# Patient Record
Sex: FEMALE | Employment: FULL TIME | URBAN - METROPOLITAN AREA
[De-identification: names, ages, dates, MRNs, and addresses within clinical notes are randomized per-mention and may not be internally consistent; named-entity substitution may affect disease eponyms.]

---

## 2017-11-16 ENCOUNTER — COMMUNICATION - HEALTHEAST (OUTPATIENT)
Dept: FAMILY MEDICINE | Facility: CLINIC | Age: 27
End: 2017-11-16

## 2017-11-21 ENCOUNTER — COMMUNICATION - HEALTHEAST (OUTPATIENT)
Dept: FAMILY MEDICINE | Facility: CLINIC | Age: 27
End: 2017-11-21

## 2017-12-01 ENCOUNTER — OFFICE VISIT - HEALTHEAST (OUTPATIENT)
Dept: FAMILY MEDICINE | Facility: CLINIC | Age: 27
End: 2017-12-01

## 2017-12-01 DIAGNOSIS — O26.859 SPOTTING IN EARLY PREGNANCY: ICD-10-CM

## 2017-12-01 DIAGNOSIS — Z00.00 ROUTINE HEALTH MAINTENANCE: ICD-10-CM

## 2017-12-01 DIAGNOSIS — Z32.01 PREGNANCY CONFIRMED BY POSITIVE URINE TEST: ICD-10-CM

## 2017-12-01 ASSESSMENT — MIFFLIN-ST. JEOR: SCORE: 1901.04

## 2017-12-04 ENCOUNTER — RECORDS - HEALTHEAST (OUTPATIENT)
Dept: ADMINISTRATIVE | Facility: OTHER | Age: 27
End: 2017-12-04

## 2017-12-07 ENCOUNTER — HOSPITAL ENCOUNTER (OUTPATIENT)
Dept: ULTRASOUND IMAGING | Facility: CLINIC | Age: 27
Discharge: HOME | End: 2017-12-07
Attending: OBSTETRICS & GYNECOLOGY

## 2017-12-07 ENCOUNTER — RECORDS - HEALTHEAST (OUTPATIENT)
Dept: ADMINISTRATIVE | Facility: OTHER | Age: 27
End: 2017-12-07

## 2017-12-07 DIAGNOSIS — N92.0 SPOTTING: ICD-10-CM

## 2017-12-12 ENCOUNTER — RECORDS - HEALTHEAST (OUTPATIENT)
Dept: ADMINISTRATIVE | Facility: OTHER | Age: 27
End: 2017-12-12

## 2018-05-07 ENCOUNTER — COMMUNICATION - HEALTHEAST (OUTPATIENT)
Dept: ADMINISTRATIVE | Facility: CLINIC | Age: 28
End: 2018-05-07

## 2018-05-07 ENCOUNTER — RECORDS - HEALTHEAST (OUTPATIENT)
Dept: ADMINISTRATIVE | Facility: OTHER | Age: 28
End: 2018-05-07

## 2018-05-08 ENCOUNTER — AMBULATORY - HEALTHEAST (OUTPATIENT)
Dept: EDUCATION SERVICES | Facility: CLINIC | Age: 28
End: 2018-05-08

## 2018-05-08 ENCOUNTER — COMMUNICATION - HEALTHEAST (OUTPATIENT)
Dept: ENDOCRINOLOGY | Facility: CLINIC | Age: 28
End: 2018-05-08

## 2018-05-08 DIAGNOSIS — O24.419 GESTATIONAL DIABETES MELLITUS (GDM), ANTEPARTUM, GESTATIONAL DIABETES METHOD OF CONTROL UNSPECIFIED: ICD-10-CM

## 2018-05-08 RX ORDER — SWAB
1 SWAB, NON-MEDICATED MISCELLANEOUS DAILY
Status: SHIPPED | COMMUNITY
Start: 2018-05-08 | End: 2023-02-10

## 2018-05-09 ENCOUNTER — COMMUNICATION - HEALTHEAST (OUTPATIENT)
Dept: ADMINISTRATIVE | Facility: CLINIC | Age: 28
End: 2018-05-09

## 2018-05-17 ENCOUNTER — AMBULATORY - HEALTHEAST (OUTPATIENT)
Dept: EDUCATION SERVICES | Facility: CLINIC | Age: 28
End: 2018-05-17

## 2018-05-17 DIAGNOSIS — O24.419 GESTATIONAL DIABETES MELLITUS (GDM), ANTEPARTUM, GESTATIONAL DIABETES METHOD OF CONTROL UNSPECIFIED: ICD-10-CM

## 2018-05-24 ENCOUNTER — AMBULATORY - HEALTHEAST (OUTPATIENT)
Dept: EDUCATION SERVICES | Facility: CLINIC | Age: 28
End: 2018-05-24

## 2018-05-24 DIAGNOSIS — O24.414 INSULIN CONTROLLED GESTATIONAL DIABETES MELLITUS (GDM) DURING PREGNANCY, ANTEPARTUM: ICD-10-CM

## 2018-05-25 ENCOUNTER — RECORDS - HEALTHEAST (OUTPATIENT)
Dept: ADMINISTRATIVE | Facility: OTHER | Age: 28
End: 2018-05-25

## 2018-05-31 ENCOUNTER — OFFICE VISIT - HEALTHEAST (OUTPATIENT)
Dept: EDUCATION SERVICES | Facility: CLINIC | Age: 28
End: 2018-05-31

## 2018-05-31 DIAGNOSIS — O24.414 INSULIN CONTROLLED GESTATIONAL DIABETES MELLITUS (GDM) IN THIRD TRIMESTER: ICD-10-CM

## 2018-05-31 ASSESSMENT — MIFFLIN-ST. JEOR: SCORE: 1930.07

## 2018-06-01 ENCOUNTER — RECORDS - HEALTHEAST (OUTPATIENT)
Dept: ADMINISTRATIVE | Facility: OTHER | Age: 28
End: 2018-06-01

## 2018-06-12 ENCOUNTER — COMMUNICATION - HEALTHEAST (OUTPATIENT)
Dept: ADMINISTRATIVE | Facility: CLINIC | Age: 28
End: 2018-06-12

## 2018-06-14 ENCOUNTER — OFFICE VISIT - HEALTHEAST (OUTPATIENT)
Dept: EDUCATION SERVICES | Facility: CLINIC | Age: 28
End: 2018-06-14

## 2018-06-14 ENCOUNTER — OFFICE VISIT - HEALTHEAST (OUTPATIENT)
Dept: ENDOCRINOLOGY | Facility: CLINIC | Age: 28
End: 2018-06-14

## 2018-06-14 DIAGNOSIS — O24.414 INSULIN CONTROLLED GESTATIONAL DIABETES MELLITUS (GDM) IN THIRD TRIMESTER: ICD-10-CM

## 2018-06-14 ASSESSMENT — MIFFLIN-ST. JEOR: SCORE: 1922.81

## 2018-06-28 ENCOUNTER — COMMUNICATION - HEALTHEAST (OUTPATIENT)
Dept: ADMINISTRATIVE | Facility: CLINIC | Age: 28
End: 2018-06-28

## 2018-07-02 ENCOUNTER — HOSPITAL ENCOUNTER (OUTPATIENT)
Dept: ULTRASOUND IMAGING | Facility: CLINIC | Age: 28
Discharge: HOME | End: 2018-07-02
Attending: OBSTETRICS & GYNECOLOGY

## 2018-07-02 DIAGNOSIS — O24.919 DIABETES IN PREGNANCY: ICD-10-CM

## 2018-07-05 ENCOUNTER — HOSPITAL ENCOUNTER (OUTPATIENT)
Dept: ULTRASOUND IMAGING | Facility: CLINIC | Age: 28
Discharge: HOME | End: 2018-07-05
Attending: OBSTETRICS & GYNECOLOGY

## 2018-07-05 DIAGNOSIS — O24.419 GESTATIONAL DIABETES MELLITUS IN PREGNANCY, UNSPECIFIED CONTROL: ICD-10-CM

## 2018-07-05 DIAGNOSIS — Z79.4 LONG TERM (CURRENT) USE OF INSULIN (H): ICD-10-CM

## 2018-07-13 ENCOUNTER — HOME CARE/HOSPICE - HEALTHEAST (OUTPATIENT)
Dept: HOME HEALTH SERVICES | Facility: HOME HEALTH | Age: 28
End: 2018-07-13

## 2018-07-14 ENCOUNTER — HOME CARE/HOSPICE - HEALTHEAST (OUTPATIENT)
Dept: HOME HEALTH SERVICES | Facility: HOME HEALTH | Age: 28
End: 2018-07-14

## 2018-07-16 ENCOUNTER — COMMUNICATION - HEALTHEAST (OUTPATIENT)
Dept: OBGYN | Facility: CLINIC | Age: 28
End: 2018-07-16

## 2019-04-23 ENCOUNTER — RECORDS - HEALTHEAST (OUTPATIENT)
Dept: LAB | Facility: CLINIC | Age: 29
End: 2019-04-23

## 2019-04-23 LAB — HCG SERPL-ACNC: ABNORMAL MLU/ML (ref 0–4)

## 2019-08-27 ENCOUNTER — RECORDS - HEALTHEAST (OUTPATIENT)
Dept: ADMINISTRATIVE | Facility: OTHER | Age: 29
End: 2019-08-27

## 2019-08-28 ENCOUNTER — COMMUNICATION - HEALTHEAST (OUTPATIENT)
Dept: ADMINISTRATIVE | Facility: CLINIC | Age: 29
End: 2019-08-28

## 2019-09-06 ENCOUNTER — THERAPY VISIT (OUTPATIENT)
Dept: CHIROPRACTIC MEDICINE | Facility: CLINIC | Age: 29
End: 2019-09-06
Payer: COMMERCIAL

## 2019-09-06 DIAGNOSIS — M99.03 SOMATIC DYSFUNCTION OF LUMBAR REGION: Primary | ICD-10-CM

## 2019-09-06 DIAGNOSIS — M53.3 SACRAL PAIN: ICD-10-CM

## 2019-09-06 DIAGNOSIS — M54.50 LUMBAGO: ICD-10-CM

## 2019-09-06 DIAGNOSIS — M99.02 THORACIC SEGMENT DYSFUNCTION: ICD-10-CM

## 2019-09-06 DIAGNOSIS — M40.00 KYPHOSIS (ACQUIRED) (POSTURAL): ICD-10-CM

## 2019-09-06 DIAGNOSIS — M99.04 SOMATIC DYSFUNCTION OF SACRAL REGION: ICD-10-CM

## 2019-09-06 PROCEDURE — 99203 OFFICE O/P NEW LOW 30 MIN: CPT | Mod: 25 | Performed by: CHIROPRACTOR

## 2019-09-06 PROCEDURE — 98941 CHIROPRACT MANJ 3-4 REGIONS: CPT | Mod: AT | Performed by: CHIROPRACTOR

## 2019-09-10 ENCOUNTER — AMBULATORY - HEALTHEAST (OUTPATIENT)
Dept: EDUCATION SERVICES | Facility: CLINIC | Age: 29
End: 2019-09-10

## 2019-09-10 DIAGNOSIS — O24.414 INSULIN CONTROLLED GESTATIONAL DIABETES MELLITUS (GDM) DURING PREGNANCY, ANTEPARTUM: ICD-10-CM

## 2019-09-10 ASSESSMENT — MIFFLIN-ST. JEOR: SCORE: 1973.73

## 2019-09-11 ENCOUNTER — COMMUNICATION - HEALTHEAST (OUTPATIENT)
Dept: ENDOCRINOLOGY | Facility: CLINIC | Age: 29
End: 2019-09-11

## 2019-09-11 DIAGNOSIS — O24.414 INSULIN CONTROLLED GESTATIONAL DIABETES MELLITUS (GDM) DURING PREGNANCY, ANTEPARTUM: ICD-10-CM

## 2019-09-11 PROBLEM — M54.50 LUMBAGO: Status: ACTIVE | Noted: 2019-09-11

## 2019-09-11 PROBLEM — M40.00 KYPHOSIS (ACQUIRED) (POSTURAL): Status: ACTIVE | Noted: 2019-09-11

## 2019-09-11 PROBLEM — M53.3 SACRAL PAIN: Status: ACTIVE | Noted: 2019-09-11

## 2019-09-11 PROBLEM — M99.02 THORACIC SEGMENT DYSFUNCTION: Status: ACTIVE | Noted: 2019-09-11

## 2019-09-11 PROBLEM — M99.04 SOMATIC DYSFUNCTION OF SACRAL REGION: Status: ACTIVE | Noted: 2019-09-11

## 2019-09-11 PROBLEM — M99.03 SOMATIC DYSFUNCTION OF LUMBAR REGION: Status: ACTIVE | Noted: 2019-09-11

## 2019-09-11 NOTE — PROGRESS NOTES
Initial Chiropractic Clinic Visit    PCP: No Ref-Primary, Physician    Nirali Remy is a 29 year old female who is seen  in consultation at the request of  Oziel Nina M.D. presenting with chronic L SI joint pain from pregnancy . Patient reports that the onset was December 2017 intermittently and associated with pregnancy.  When asked, patient denies:, falling, slipping, bending and reaching or sleeping awkwardly. The pt is currently 7 months pregnant. She reports L sided SI joint pain with some radiation to the L buttock area. She grades the px a 2-6/10 depending on her activity. She states she has gained weight since the pregnancy and it has increased her discomfort. The pt denies weakness in the extremities or other unusual sx. Walking will increase the px. Sleeping is very uncomfortable.  Prior to onset, the patient was able to sit for 8 hours . Patient notes that due to symptoms, they can only walk under one mile . Nirali Remy notes   Walking  rated at a 6/10 and  prior to this onset it was 2/10.        Injury: There was no injury associated with this episode     Location of Pain: left SI joint pain at the following level(s) T5 , T9 , L5 , Sacrum  and PSIS Left   Duration of Pain: 1.5 years   Rating of Pain at worst: 6/10  Rating of Pain Currently: 5/10  Symptoms are better with: Nothing  Symptoms are worse with: sitting, standing and walking, sleeping  Additional Features: Currently 7 months pregnant       Health History  as reported by the patient:    How does the patient rate their own health:   Fair    Current or past medical history:   Currently pregnant, Diabetes and Overweight    Medical allergies  None    Past Traumas/Surgeries  Other:  Gallbladder removal     Family History  History reviewed. No pertinent family history.    Medications:  None    Occupation:  Clinical research associate     Primary job tasks:   Computer work, Lifting/carrying and Prolonged sitting    Barriers as home/work:  "  none    Additional health Issues:     None             Tsiry was asked to complete the Oswestry Low Back Disability Index and [place] Start Back screening tool, today in the office.  Disability score: 12%. Keel Start Total Score:6 Sub Score: 4     Review of Systems  Musculoskeletal: as above  Remainder of review of systems is negative including constitutional, CV, pulmonary, GI, Skin and Neurologic except as noted in HPI or medical history.    History reviewed. No pertinent past medical history.  History reviewed. No pertinent surgical history.  Objective  There were no vitals taken for this visit.      GENERAL APPEARANCE: healthy, alert and no distress   GAIT: NORMAL  SKIN: no suspicious lesions or rashes  NEURO: Normal strength and tone, mentation intact and speech normal  PSYCH:  mentation appears normal and affect normal/bright    Low back exam:    Inspection:  \"     no visible deformity in the low back       normal skin\"  Slumped seated posture, anterior pelvic flexion with sacral/coccyx seated posture, Increased thoracic kyphosis with subsequent anterior cervical carriage. Poor seated posture      ROM:       limited flexion due to pain       limited extension due to pain    Tender:       paraspinal muscles       B QL     Non Tender:       remainder of lumbar spine    Strength:       hip flexion 5/5 Normal       knee extension 5/5 Normal       ankle dorsiflexion 5/5 Normal       ankle plantarflexion 5/5 Normal       dorsiflexion of the great toe 5/5 Normal    Reflexes:       patellar (L3, L4) 2 bilaterally       achilles tendons (S1) 2 bilaterally    Sensation:      grossly intact throughout lower extremities    Special tests:  Kemps - Right positive, low back pain and Left positive, low back pain, SLR - Right negative and Left negative, Gaenslen's - Right negative and Fabere - Right negative and Left positive, hip pain     Segmental spinal dysfunction/restrictions found at:  T5 , T9 , L5 , Sacrum  and PSIS " Left     The following soft tissue hypotonicities were observed:Quad lumb: bilateral, referred pain: no  T paraspinals: ache and dull pain, no    Trigger points were found in:none     Muscle spasm found in:Lumbar erector spine and Quad lumb      Radiology:  None     Assessment:    1. Somatic dysfunction of lumbar region    2. Lumbago    3. Somatic dysfunction of sacral region    4. Sacral pain    5. Thoracic segment dysfunction    6. Kyphosis (acquired) (postural)        RX ordered/plan of care  Anticipated outcomes  Possible risks and side effects    After discussing the risk and benefits of care, patient consented to treatment    Prognosis: Excellent      Patient's condition:  Patient had restrictions pre-manipulation    Treatment effectiveness:  Post manipulation there is better intersegmental movement and Patient claims to feel looser post manipulation      Plan:    Procedures:  Evaluation and Management:  97182 Moderate level exam 30 min    CMT:  11258 Chiropractic manipulative treatment 3-4 regions performed   Thoracic: Diversified, T5, T9, Prone  Lumbar: Diversified, L5, Side posture  Pelvis: Drop Table, Sacrum , PSIS Left , Prone    Modalities:  None performed this visit    Therapeutic procedures:  None    Response to Treatment  Reduction in symptoms as reported by patient      Treatment plan and goals:  Goals:  SLEEPING: the patient specific goal is to attain his pre-injury status of 6-8 hours comfortably  Walking  Mile  Lifting 20 lbs     Frequency of care  Duration of care is estimated to be 6-8 weeks, from the initial treatment.  It is estimated that the patient will need a total of 6-8 visits to resolve this episode.  For the initial therapeutic trial of care, the frequency is recommended at 1 X week, once daily.  A reevaluation would be clinically appropriate in 6-8 visits, to determine progress and further course of care.    In-Office Treatment  Evaluation  Spinal Chiropractic Manipulative  Therapy          Recommendations:    Instructions:  expect soreness    Follow-up:    Return to care in 1 week with US   Postural correction NV.       Discussed the assessment with the patient.      Disclaimer: This note consists of symbols derived from keyboarding, dictation and/or voice recognition software. As a result, there may be errors in the script that have gone undetected. Please consider this when interpreting information found in this chart.

## 2019-09-12 ENCOUNTER — COMMUNICATION - HEALTHEAST (OUTPATIENT)
Dept: ADMINISTRATIVE | Facility: CLINIC | Age: 29
End: 2019-09-12

## 2019-09-12 DIAGNOSIS — O24.414 INSULIN CONTROLLED GESTATIONAL DIABETES MELLITUS (GDM) IN THIRD TRIMESTER: ICD-10-CM

## 2019-09-13 ENCOUNTER — THERAPY VISIT (OUTPATIENT)
Dept: CHIROPRACTIC MEDICINE | Facility: CLINIC | Age: 29
End: 2019-09-13
Payer: COMMERCIAL

## 2019-09-13 DIAGNOSIS — M99.04 SOMATIC DYSFUNCTION OF SACRAL REGION: ICD-10-CM

## 2019-09-13 DIAGNOSIS — M53.3 SACRAL PAIN: ICD-10-CM

## 2019-09-13 DIAGNOSIS — M99.02 THORACIC SEGMENT DYSFUNCTION: ICD-10-CM

## 2019-09-13 DIAGNOSIS — M99.03 SOMATIC DYSFUNCTION OF LUMBAR REGION: Primary | ICD-10-CM

## 2019-09-13 DIAGNOSIS — G89.29 CHRONIC LEFT-SIDED LOW BACK PAIN WITHOUT SCIATICA: ICD-10-CM

## 2019-09-13 DIAGNOSIS — M40.00 KYPHOSIS (ACQUIRED) (POSTURAL): ICD-10-CM

## 2019-09-13 DIAGNOSIS — M54.50 CHRONIC LEFT-SIDED LOW BACK PAIN WITHOUT SCIATICA: ICD-10-CM

## 2019-09-13 PROCEDURE — 98941 CHIROPRACT MANJ 3-4 REGIONS: CPT | Mod: AT | Performed by: CHIROPRACTOR

## 2019-09-13 PROCEDURE — 97035 APP MDLTY 1+ULTRASOUND EA 15: CPT | Performed by: CHIROPRACTOR

## 2019-09-13 NOTE — PROGRESS NOTES
Visit #:  2    Subjective:  Nirali Remy is a 29 year old female who is seen in f/u up for:        Somatic dysfunction of lumbar region  Chronic left-sided low back pain without sciatica  Somatic dysfunction of sacral region  Sacral pain  Thoracic segment dysfunction  Kyphosis (acquired) (postural).     Since last visit on 9/6/2019,  Nirali Remy reports:    Area of chief complaint:  Lumbar :  Symptoms are graded at 4/10. The quality is described as stiff, achey, dull.  Motion has increased, but is still not normal. The pt reports 75% subjective improvement since initial presentation. She reports L sided SI and low back pain. Patient feels that they are improved due to a reduction in symptoms. The pt is able to sit and sleep with much less pain overall. She notes much less pain in the SI joint area on the L side. The pt denies radiation of pain in the extremities. She is pleased with her progress. The pt denies weakness in the extremities or other unusual sx.     Since last visit the patient feels that they are 75 percent  improved from last visit.       Objective:  The following was observed:    P: palpatory tenderness Lumbar erector spine and Quad lumb L>>R  A: static palpation demonstrates intersegmental asymmetry   R: motion palpation notes restricted motion  T: hypertonicity at: Lumbar erector spine and Quad lumb L>>R    Segmental spinal dysfunction/restrictions found at:  T5 , T9 , L5 , Sacrum  and PSIS Left       Assessment:    Diagnoses:      1. Somatic dysfunction of lumbar region    2. Chronic left-sided low back pain without sciatica    3. Somatic dysfunction of sacral region    4. Sacral pain    5. Thoracic segment dysfunction    6. Kyphosis (acquired) (postural)        Patient's condition:  Patient had restrictions pre-manipulation    Treatment effectiveness:  Post manipulation there is better intersegmental movement and Patient claims to feel looser post manipulation      Procedures:  CMT:  48629  Chiropractic manipulative treatment 3-4 regions performed   Thoracic: Diversified, T5, T9, Prone  Lumbar: Diversified, L5, Side posture  Pelvis: Drop Table, Sacrum , PSIS Left , Prone    Modalities:  58324: US:  1.6 Ramírez/cm squared for 8  minutes at 1 mhz  Location: L QL    Therapeutic procedures:  None    Response to Treatment  Reduction in symptoms as reported by patient    Prognosis: Excellent    Progress towards Goals: Patient is making progress towards the goal.  Goals:  SLEEPING: the patient specific goal is to attain his pre-injury status of 6-8 hours comfortably  Walking  Mile  Lifting 20 lbs          Recommendations:    Instructions:  none     Follow-up:    Return to care in 1 week with US therapy.

## 2019-09-18 ENCOUNTER — AMBULATORY - HEALTHEAST (OUTPATIENT)
Dept: EDUCATION SERVICES | Facility: CLINIC | Age: 29
End: 2019-09-18

## 2019-09-18 DIAGNOSIS — O24.414 INSULIN CONTROLLED GESTATIONAL DIABETES MELLITUS (GDM) DURING PREGNANCY, ANTEPARTUM: ICD-10-CM

## 2019-09-20 ENCOUNTER — THERAPY VISIT (OUTPATIENT)
Dept: CHIROPRACTIC MEDICINE | Facility: CLINIC | Age: 29
End: 2019-09-20
Payer: COMMERCIAL

## 2019-09-20 DIAGNOSIS — G89.29 CHRONIC LEFT-SIDED LOW BACK PAIN WITHOUT SCIATICA: ICD-10-CM

## 2019-09-20 DIAGNOSIS — M99.03 SOMATIC DYSFUNCTION OF LUMBAR REGION: Primary | ICD-10-CM

## 2019-09-20 DIAGNOSIS — M99.02 THORACIC SEGMENT DYSFUNCTION: ICD-10-CM

## 2019-09-20 DIAGNOSIS — M53.3 SACRAL PAIN: ICD-10-CM

## 2019-09-20 DIAGNOSIS — M54.50 CHRONIC LEFT-SIDED LOW BACK PAIN WITHOUT SCIATICA: ICD-10-CM

## 2019-09-20 DIAGNOSIS — M99.04 SOMATIC DYSFUNCTION OF SACRAL REGION: ICD-10-CM

## 2019-09-20 PROCEDURE — 97035 APP MDLTY 1+ULTRASOUND EA 15: CPT | Performed by: CHIROPRACTOR

## 2019-09-20 PROCEDURE — 98941 CHIROPRACT MANJ 3-4 REGIONS: CPT | Mod: AT | Performed by: CHIROPRACTOR

## 2019-09-20 PROCEDURE — 97110 THERAPEUTIC EXERCISES: CPT | Performed by: CHIROPRACTOR

## 2019-09-22 NOTE — PROGRESS NOTES
Visit #:  2    Subjective:  Nirali Remy is a 29 year old female who is seen in f/u up for:        Somatic dysfunction of lumbar region  Chronic left-sided low back pain without sciatica  Somatic dysfunction of sacral region  Sacral pain  Thoracic segment dysfunction.     Since last visit,   Nirali Remy reports:    Area of chief complaint:  Lumbar :  Symptoms are graded at 4/10. The quality is described as stiff, achey, dull.  Motion has increased, but is still not normal. The pt reports 75% -80% improvement since initial presentation. She is pleased with her progress. She notes pain in the sacral area as she is getting larger with pregnancy. The px is located in the hips. She is sleeping much better. The pt denies weakness or other unusual sx.     Since last visit the patient feels that they are 75-80 percent  improved from last visit.       Objective:  The following was observed:    P: palpatory tenderness Lumbar erector spine and Quad lumb L>>R  A: static palpation demonstrates intersegmental asymmetry   R: motion palpation notes restricted motion  T: hypertonicity at: Lumbar erector spine and Quad lumb L>>R    Segmental spinal dysfunction/restrictions found at:  T5 , T9 , L5 , Sacrum  and PSIS Left       Assessment:    Diagnoses:      1. Somatic dysfunction of lumbar region    2. Chronic left-sided low back pain without sciatica    3. Somatic dysfunction of sacral region    4. Sacral pain    5. Thoracic segment dysfunction        Patient's condition:  Patient had restrictions pre-manipulation    Treatment effectiveness:  Post manipulation there is better intersegmental movement and Patient claims to feel looser post manipulation      Procedures:  CMT:  82346 Chiropractic manipulative treatment 3-4 regions performed   Thoracic: Diversified, T5, T9, Prone  Lumbar: Diversified, L5, Side posture  Pelvis: Drop Table, Sacrum , PSIS Left , Prone    Modalities:  56234: US:  1.6 Ramírez/cm squared for 8  minutes at 1  mhz  Location: L QL    Therapeutic procedures:  Gave hamstring stretch supine with use of a belt or towel for maximum stretch. Gave standing hamstring stretch and nerve traction as per stretch protocol with demonstration.   Gave seated internal and external hip rotation with demonstration  Gave supine external hip rotation or figure 4 stretch with demonstration as per stretch protocol.   Gave happy baby stretch with demonstration       Response to Treatment  Reduction in symptoms as reported by patient    Prognosis: Excellent    Progress towards Goals: Patient is making progress towards the goal.  Goals:  SLEEPING: the patient specific goal is to attain his pre-injury status of 6-8 hours comfortably  Walking  Mile  Lifting 20 lbs          Recommendations:    Instructions:  none     Follow-up:    Return to care in 1 week with US therapy.

## 2019-09-27 ENCOUNTER — THERAPY VISIT (OUTPATIENT)
Dept: CHIROPRACTIC MEDICINE | Facility: CLINIC | Age: 29
End: 2019-09-27
Payer: COMMERCIAL

## 2019-09-27 DIAGNOSIS — M99.04 SOMATIC DYSFUNCTION OF SACRAL REGION: ICD-10-CM

## 2019-09-27 DIAGNOSIS — M54.50 CHRONIC LEFT-SIDED LOW BACK PAIN WITHOUT SCIATICA: ICD-10-CM

## 2019-09-27 DIAGNOSIS — M53.3 SACRAL PAIN: ICD-10-CM

## 2019-09-27 DIAGNOSIS — M99.02 THORACIC SEGMENT DYSFUNCTION: ICD-10-CM

## 2019-09-27 DIAGNOSIS — G89.29 CHRONIC LEFT-SIDED LOW BACK PAIN WITHOUT SCIATICA: ICD-10-CM

## 2019-09-27 DIAGNOSIS — M99.03 SOMATIC DYSFUNCTION OF LUMBAR REGION: Primary | ICD-10-CM

## 2019-09-27 PROCEDURE — 98941 CHIROPRACT MANJ 3-4 REGIONS: CPT | Mod: AT | Performed by: CHIROPRACTOR

## 2019-09-28 NOTE — PROGRESS NOTES
Visit #:  4    Subjective:  Nirali Remy is a 29 year old female who is seen in f/u up for:        Somatic dysfunction of lumbar region  Chronic left-sided low back pain without sciatica  Somatic dysfunction of sacral region  Sacral pain  Thoracic segment dysfunction.     Since last visit,   Nirali Remy reports:    Area of chief complaint:  Lumbar :  Symptoms are graded at =2/10. The quality is described as stiff, achey, dull.  Motion has increased, but is still not normal. The pt reports 85% improvement since initial presentation. She is pleased with her progress. She is now able to sit and stand without sacral pain. The pt is pleased with her progress. She notes ability to turn over in bed without sacral pain. The pt denies weakness or other unusual sx.     Since last visit the patient feels that they are 85 percent  improved from last visit.       Objective:  The following was observed:    P: palpatory tenderness Lumbar erector spine and Quad lumb L>>R  A: static palpation demonstrates intersegmental asymmetry   R: motion palpation notes restricted motion  T: hypertonicity at: Lumbar erector spine and Quad lumb L>>R    Segmental spinal dysfunction/restrictions found at:  T5 , T9 , L5 , Sacrum  and PSIS Left       Assessment:    Diagnoses:      1. Somatic dysfunction of lumbar region    2. Chronic left-sided low back pain without sciatica    3. Somatic dysfunction of sacral region    4. Sacral pain    5. Thoracic segment dysfunction        Patient's condition:  Patient had restrictions pre-manipulation    Treatment effectiveness:  Post manipulation there is better intersegmental movement and Patient claims to feel looser post manipulation      Procedures:  CMT:  94233 Chiropractic manipulative treatment 3-4 regions performed   Thoracic: Diversified, T5, T9, Prone  Lumbar: Diversified, L5, Side posture  Pelvis: Drop Table, Sacrum , PSIS Left , Prone    Modalities:  74523: US:  1.6 Ramírez/cm squared for 8  minutes  at 1 mhz  Location: L QL    Therapeutic procedures:  None     Response to Treatment  Reduction in symptoms as reported by patient    Prognosis: Excellent    Progress towards Goals: Patient is making progress towards the goal.  Goals:  SLEEPING: the patient specific goal is to attain his pre-injury status of 6-8 hours comfortably  Walking  Mile  Lifting 20 lbs          Recommendations:    Instructions:  none     Follow-up:    Return to care in 2 week with US therapy.

## 2019-10-10 ENCOUNTER — OFFICE VISIT - HEALTHEAST (OUTPATIENT)
Dept: EDUCATION SERVICES | Facility: CLINIC | Age: 29
End: 2019-10-10

## 2019-10-10 DIAGNOSIS — O24.414 INSULIN CONTROLLED GESTATIONAL DIABETES MELLITUS (GDM) DURING PREGNANCY, ANTEPARTUM: ICD-10-CM

## 2019-10-17 ENCOUNTER — OFFICE VISIT - HEALTHEAST (OUTPATIENT)
Dept: ENDOCRINOLOGY | Facility: CLINIC | Age: 29
End: 2019-10-17

## 2019-10-17 DIAGNOSIS — O24.414 INSULIN CONTROLLED GESTATIONAL DIABETES MELLITUS (GDM) IN THIRD TRIMESTER: ICD-10-CM

## 2019-10-17 ASSESSMENT — MIFFLIN-ST. JEOR: SCORE: 1985.53

## 2019-10-25 ENCOUNTER — RECORDS - HEALTHEAST (OUTPATIENT)
Dept: ADMINISTRATIVE | Facility: OTHER | Age: 29
End: 2019-10-25

## 2019-11-06 ENCOUNTER — THERAPY VISIT (OUTPATIENT)
Dept: CHIROPRACTIC MEDICINE | Facility: CLINIC | Age: 29
End: 2019-11-06
Payer: COMMERCIAL

## 2019-11-06 DIAGNOSIS — M99.04 SOMATIC DYSFUNCTION OF SACRAL REGION: ICD-10-CM

## 2019-11-06 DIAGNOSIS — M54.50 CHRONIC LEFT-SIDED LOW BACK PAIN WITHOUT SCIATICA: ICD-10-CM

## 2019-11-06 DIAGNOSIS — M99.03 SOMATIC DYSFUNCTION OF LUMBAR REGION: Primary | ICD-10-CM

## 2019-11-06 DIAGNOSIS — M53.3 SACRAL PAIN: ICD-10-CM

## 2019-11-06 DIAGNOSIS — G89.29 CHRONIC LEFT-SIDED LOW BACK PAIN WITHOUT SCIATICA: ICD-10-CM

## 2019-11-06 DIAGNOSIS — M99.02 THORACIC SEGMENT DYSFUNCTION: ICD-10-CM

## 2019-11-06 PROCEDURE — 97035 APP MDLTY 1+ULTRASOUND EA 15: CPT | Performed by: CHIROPRACTOR

## 2019-11-06 PROCEDURE — 98941 CHIROPRACT MANJ 3-4 REGIONS: CPT | Mod: AT | Performed by: CHIROPRACTOR

## 2019-11-06 NOTE — PROGRESS NOTES
Visit #:  4    Subjective:  Nirali Remy is a 29 year old female who is seen in f/u up for:        Somatic dysfunction of lumbar region  Sacral pain  Somatic dysfunction of sacral region  Chronic left-sided low back pain without sciatica  Thoracic segment dysfunction.     Since last visit,   Nirali Remy reports:    Area of chief complaint:  Lumbar :  Symptoms are graded at 4/10. The pt is in her last month of child birth and is due tin 3-4 weeks. She is moderately uncomfortable. She notes pain on the L side of the low back and sacrum. There is no radiation of pain in the extremities. The pt denies weakness or other unusual sx.     Since last visit the patient feels that they are 80 percent  improved from last visit-slight exacerbation        Objective:  The following was observed:    P: palpatory tenderness Lumbar erector spine and Quad lumb L>>R  A: static palpation demonstrates intersegmental asymmetry   R: motion palpation notes restricted motion  T: hypertonicity at: Lumbar erector spine and Quad lumb L>>R    Segmental spinal dysfunction/restrictions found at:  T5 , T9 , L5 , Sacrum  and PSIS Left       Assessment:    Diagnoses:      1. Somatic dysfunction of lumbar region    2. Sacral pain    3. Somatic dysfunction of sacral region    4. Chronic left-sided low back pain without sciatica    5. Thoracic segment dysfunction        Patient's condition:  Exacerbation     Treatment effectiveness:  Post manipulation there is better intersegmental movement and Patient claims to feel looser post manipulation      Procedures:  CMT:  15926 Chiropractic manipulative treatment 3-4 regions performed   Thoracic: Diversified, T5, T9, Prone  Lumbar: Diversified, L5, Side posture  Pelvis: Drop Table, Sacrum , PSIS Left , Prone   Anterior R pubis drop table     Modalities:  71636: US:  1.8 Ramírez/cm squared for 8  minutes at 1 mhz  Location: L QL    Therapeutic procedures:  None     Response to Treatment  Reduction in symptoms  as reported by patient    Prognosis: Excellent    Progress towards Goals: Patient is making progress towards the goal.  Goals:  SLEEPING: the patient specific goal is to attain his pre-injury status of 6-8 hours comfortably  Walking  Mile  Lifting 20 lbs          Recommendations:    Instructions:  none     Follow-up:    Return to care in 2 week with US therapy.

## 2019-11-25 ENCOUNTER — COMMUNICATION - HEALTHEAST (OUTPATIENT)
Dept: ENDOCRINOLOGY | Facility: CLINIC | Age: 29
End: 2019-11-25

## 2019-11-25 DIAGNOSIS — O24.414 INSULIN CONTROLLED GESTATIONAL DIABETES MELLITUS (GDM) IN THIRD TRIMESTER: ICD-10-CM

## 2019-12-12 ENCOUNTER — HOME CARE/HOSPICE - HEALTHEAST (OUTPATIENT)
Dept: HOME HEALTH SERVICES | Facility: HOME HEALTH | Age: 29
End: 2019-12-12

## 2019-12-16 ENCOUNTER — COMMUNICATION - HEALTHEAST (OUTPATIENT)
Dept: OBGYN | Facility: CLINIC | Age: 29
End: 2019-12-16

## 2020-03-11 ENCOUNTER — HEALTH MAINTENANCE LETTER (OUTPATIENT)
Age: 30
End: 2020-03-11

## 2021-01-03 ENCOUNTER — HEALTH MAINTENANCE LETTER (OUTPATIENT)
Age: 31
End: 2021-01-03

## 2021-04-13 ENCOUNTER — AMBULATORY - HEALTHEAST (OUTPATIENT)
Dept: NURSING | Facility: CLINIC | Age: 31
End: 2021-04-13

## 2021-04-25 ENCOUNTER — HEALTH MAINTENANCE LETTER (OUTPATIENT)
Age: 31
End: 2021-04-25

## 2021-05-04 ENCOUNTER — AMBULATORY - HEALTHEAST (OUTPATIENT)
Dept: NURSING | Facility: CLINIC | Age: 31
End: 2021-05-04

## 2021-05-31 VITALS — HEIGHT: 66 IN | BODY MASS INDEX: 40.76 KG/M2 | WEIGHT: 253.6 LBS

## 2021-05-31 NOTE — TELEPHONE ENCOUNTER
External - [facility]   Referring Provider: Dr. Nina  DX: GDM- previous preg w/GDM    Referral received with no records in DM consult folder on 08.27.2019 @  5:48    Please call referring for lab results.

## 2021-06-01 VITALS — WEIGHT: 261.7 LBS | BODY MASS INDEX: 41.92 KG/M2

## 2021-06-01 VITALS — BODY MASS INDEX: 41.33 KG/M2 | WEIGHT: 258 LBS

## 2021-06-01 VITALS — HEIGHT: 66 IN | WEIGHT: 258.4 LBS | BODY MASS INDEX: 41.53 KG/M2

## 2021-06-01 VITALS — BODY MASS INDEX: 42.03 KG/M2 | WEIGHT: 262.4 LBS

## 2021-06-01 VITALS — BODY MASS INDEX: 41.78 KG/M2 | WEIGHT: 260 LBS | HEIGHT: 66 IN

## 2021-06-01 NOTE — PROGRESS NOTES
"   [facility] Gestational Diabetes Care Plan    Assessment: Nirali is here with her young daughter today for follow-up on her Gestational Diabetes.  She was started on Lantus last week for high FBG.  She increased her dose to 10 units and her FBG remains high, 103/98/100.  Discussed to increase to 12 units tonight and increase by 2 units every 2 days FBG is over 95.  She understands this.      All additional questions and concerns addressed today.    Plan: She will follow-up with endocrinology in 2 weeks, calling sooner if she starts to have high post prandial readings.    Provider: Maico  Provider's Diagnosis (per referral form) Gestational Diabetes (648.83)    Weight: (!) 267 lb (121.1 kg)  OGTT: High FBG  EDC: Estimated Date of Delivery: 12/15/19  Pregnancy number: 2  Previous GDM: Yes    Medications:   Current Outpatient Medications:      acetone, urine, test Strp, Check urine for ketones once per day in the morning., Disp: 50 strip, Rfl: 2     blood glucose meter (GLUCOMETER), Use 1 each As Directed as needed. Dispense glucometer brand per patient's insurance at pharmacy discretion., Disp: 1 each, Rfl: 0     blood glucose test (GLUCOSE BLOOD) strips, Use 1 each As Directed 4 (four) times a day. Dispense brand per patient's insurance at pharmacy discretion., Disp: 400 strip, Rfl: 0     cholecalciferol, vitamin D3, 1,000 unit tablet, Take 4,000 Units by mouth daily., Disp: , Rfl:      generic lancets, Check blood sugar 4 times per day. Microlet lancets., Disp: 400 each, Rfl: 0     insulin glargine (LANTUS SOLOSTAR U-100 INSULIN) 100 unit/mL (3 mL) pen, Inject 8 units at bedtime, increase as instructed; up to 40 units daily (Patient taking differently: 10 Units. Inject 8 units at bedtime, increase as instructed; up to 40 units daily   ), Disp: 36 mL, Rfl: 0     pen needle, diabetic 32 gauge x 5/32\" Ndle, Use 1 each As Directed daily., Disp: 100 each, Rfl: 2     prenatal vitamin iron-folic acid 27mg-0.8mg " (PRENATAL S) 27 mg iron- 800 mcg Tab tablet, Take 1 tablet by mouth daily., Disp: , Rfl:   PNV: Yes  Supplements: Yes    BG monitoring goals: Fasting <95; 1 hour post start of meal <140. Test 4 x per day.  Check fasting a.m. ketones: Yes  GDM meal pattern/carb counting taught per guidelines: Yes    Time: 30 minutes DSMT  Visit Type: GDM Individual Follow-up  Visit #: 2      Jessica Roe  9/18/2019    DIABETES CARE PLAN AND EDUCATION RECORD    Gestational Diabetes Disease Process/Preconception Care/Management During Pregnancy/Postpartum:Assessed and Discussed    Meter (per above goals): Assessed and Discussed    Nutrition Management    Weight: Assessed and Discussed  Portions/Balance: Assessed and discussed  Carb ID/Count: Assessed and discussed  Label Reading: Assessed and discussed  Menu Planning: Assessed and Discussed  Dining Out: Assessed and Discussed  Physical Activity: Assessed and Discussed  Medications: Assessed and Discussed    Acute Complications: Prevent, Detect, Treat:      Hyperglycemia: Assessed and Discussed  Goal Setting and Problem Solving: Assessed and Discussed  Barriers: Assessed and Discussed  Psychosocial Adjustments: Assessed and Discussed    I agree with the aforementioned diabetes plan.  Ela Mckeonjosephine  [facility] Endocr[facility]  9/18/2019  12:57 PM

## 2021-06-01 NOTE — PATIENT INSTRUCTIONS - HE
1. Check urine for ketones in the morning. Your goal is negative or trace ketones. If you have ketones in your urine it means you are not eating enough before you go to bed. Eat a larger bedtime snack and include protein. Remember that ketones are not good for your baby. Drinking water during the day helps to dilute ketones.    2. Test blood sugar 4 times per day. Before breakfast and 1 hour after meals.        Blood sugar goals:       Before breakfast: less 95      1 hour after meals: less 140      2 hours after meals: less 120    3. Eat 3 meals and 3 snacks per day according to the meal plan.   Breakfast: 30 grams of carbohydrate with protein   Lunch and dinner: 45-60 grams of carbohydrate  Snacks: 15-30 grams of carbohydrate with protein    4. Due to high fasting blood sugars, start 8 units NPH insulin at bedtime. Increase the dose of NPH insulin by 2 units every other night until your blood sugars before breakfast are under 95mg/dl. Once your fasting blood sugars are less than 95 for 3 days do not increase NPH insulin.    If you have 3 high blood sugars in 1 week, please call Diabetes Care ([phone]).    5. Avoid high sugar, low nutrient foods like sugary drinks, candy chocolate, syrup, chips and desserts.    6. Staying active after meals helps to decrease blood sugar.    7. Keep a detailed food and blood sugar record and note ketone levels. Bring meter and food records to next visit in 1 week.

## 2021-06-01 NOTE — PROGRESS NOTES
Gestational Diabetes Care Plan    Assessment: Initial visit for gestational diabetes counseling. Nirali Remy had gestational diabetes with her last pregnancy and was on insulin-2018. Briefly reviewed what is gestational diabetes and risks to mom and baby. Provided information on carbohydrate counting which she remembered. Demonstrated how to check blood sugar. Provided a Contour Next One glucometer as her insurance changed with this pregnancy. Blood sugar in the office was 137 (2.5 hours after lunch).    Nirali has been checking her blood sugar for 2 weeks using the One Touch Verio meter she received with her last pregnancy. All fasting blood sugars are above target. Per medication protocol patient to start NPH insulin at bedtime. Demonstrated how to mix, measure and inject insulin. Discussed proper disposal of sharps.     FB, 102, 114, 107, 100, 125, 104, 107, 113, 119, 114, 119, 117  1 post-lunch blood sugar above goal: 145     Plan:  1. Check urine for ketones in the morning. Your goal is negative or trace ketones. If you have ketones in your urine it means you are not eating enough before you go to bed. Eat a larger bedtime snack and include protein. Remember that ketones are not good for your baby. Drinking water during the day helps to dilute ketones.    2. Test blood sugar 4 times per day. Before breakfast and 1 hour after meals.        Blood sugar goals:       Before breakfast: less 95      1 hour after meals: less 140      2 hours after meals: less 120    3. Eat 3 meals and 3 snacks per day according to the meal plan.   Breakfast: 30 grams of carbohydrate with protein   Lunch and dinner: 45-60 grams of carbohydrate  Snacks: 15-30 grams of carbohydrate with protein    4. Due to high fasting blood sugars, start 8 units NPH insulin at bedtime. Increase the dose of NPH insulin by 2 units every other night until your blood sugars before breakfast are under 95mg/dl. Once your fasting blood sugars are  "less than 95 for 3 days do not increase NPH insulin.    If you have 3 high blood sugars in 1 week, please call Diabetes Care ([phone]).    5. Avoid high sugar, low nutrient foods like sugary drinks, candy chocolate, syrup, chips and desserts.    6. Staying active after meals helps to decrease blood sugar.    7. Keep a detailed food and blood sugar record and note ketone levels. Bring meter and food records to next visit in 1 week.    SUBJECTIVE/OBJECTIVE:  Provider: [facility]  Provider's Diagnosis (per referral form): Gestational diabetes diagnosed at 29 weeks, 2 days (024.410) with insulin start if indicated    Eating habits the past 2 weeks. Occasional juice or sugar soda.  Breakfast: eggs and 1/2 avocado, tea with half and half (no sugar). No carbohydrate   Snack: chocolate covered almonds or half banana or danyell or Greek yogurt  Lunch: Leftovers same portions as dinner, water  Snack: fruit or nuts or yogurt  Dinner: Beef, chicken, [place], 1 cup Basmati rice, vegetables, water  Bedtime snack: 3-4 Multi-grain crackers, 1 slice cheese    Activity: Active with her one year old daughter. Goes up and down the stairs a lot. Does not sit often.    Weight: 267 lb  Pre-pregnancy weight: 267 lb  Patient is nauseous in the beginning of he pregnancies. May lose 20-30 pounds initially. Then starts gaining the last trimester.    OGTT: No results found for this or any previous visit.  EDC: Estimated Date of Delivery: 12-  Pregnancy #: 2 (1 year old girl at home) Knows this baby is a girl  Previous GDM: Yes. Was on insulin. First baby weighed over 9 pounds  Medications:   Current Outpatient Medications:      cholecalciferol, vitamin D3, 1,000 unit tablet, Take 4,000 Units by mouth daily., Disp: , Rfl:      lancets (ONETOUCH DELICA LANCETS) 30 gauge Misc, Use 1 each As Directed 4 (four) times a day., Disp: 100 each, Rfl: 2     pen needle, diabetic 32 gauge x 5/32\" Ndle, " Use 1 each As Directed daily., Disp: 100 each, Rfl: 1     prenatal vitamin iron-folic acid 27mg-0.8mg (PRENATAL S) 27 mg iron- 800 mcg Tab tablet, Take 1 tablet by mouth daily., Disp: , Rfl:     Meter: Contour Next One    DIABETES CARE PLAN AND EDUCATION RECORD  Provided [place] Gestational Diabetes booklet, HE placemat for carb counting, [place] high/low blood sugar, & 1 page food and BG record, & [place] GDM log book.    Gestational Diabetes Disease Process/Management During Pregnancy: Discussed and Literature provided    Meter (per above goals): Discussed and demonstrated Contour Next One. Had different insurance during last pregnancy. Used the One Touch Verio meter in 2018.    Nutrition Management  Weight: Assessed and Discussed  Portions/Balance: Assessed and Discussed   Carb ID/Count: Literature provided. Did not review. Does not want to eat carbs at breakfast.  Label Reading: Did not review  Menu Planning: provided    Medications  Injected Medications: Discussed and Demonstrated how to mix, measure and inject Humulin NPH insulin.  Storage/Expiration: Discussed and Literature provided  Site Rotation: Discussed and Literature provided  Disposal of Sharps: Discussed and Competent    Acute Complications: Prevent, Detect, Treat:  Barriers: Assessed   Psychosocial Adjustments: Assessed      Time: 60 Minutes  Visit Type: Gestational Diabetes/Individual Diabetes Self-Management Training ()  Visit #: 1    Faxed copy of progress note to   Fax#  [phone]    I agree with the aforementioned diabetes plan.  Ela WATERS [facility] Endocrinology  9/10/2019  3:37 PM

## 2021-06-01 NOTE — TELEPHONE ENCOUNTER
Per patient will  Lantus prescription.     She is using her old meter and apparently it's covered by her insurance. A new prescription was also sent for a new meter and now she's not sure which one to use.   I informed her which ever brand she decides to use, she'll have to let our office know so that we can send a prescriptions for the testing supplies. She said that she'll call back.

## 2021-06-01 NOTE — TELEPHONE ENCOUNTER
9/4-Called medical records and there are no labs. They stated this patient has had GDM in the past and she is testing at home.  Is it ok to schedule a appt with out labs?

## 2021-06-01 NOTE — TELEPHONE ENCOUNTER
GDM patient    She spoke w/[facility] and was told a PA is required on the insulin. Please start PA asap.     Nirali @[phone]

## 2021-06-02 NOTE — PROGRESS NOTES
"MOHS[facility] GDM Care Plan      Assessment/Plan: Nirali is here alone today for her follow-up on Gestational Diabetes.  She did not bring her meter or Log book with her today.  She is currently taking 22 units of Lantus at bedtime.  Stated her fasting readings have been running 100-110 mostly, her post prandial readings have all been within target and ketones have been good except a couple days with moderate.    All additional questions and concerns addressed today.    Increased her Lantus to 28 units, hopefully this will get ahead of her readings.  She will follow-up in 2 weeks with endocrinology.    Time: 30 minutes  Visit Type: pregnancy clinic   Provider: [facility]  Provider's Diagnosis (per referral form): Gestational (648.83)    Weight: (!) 269 lb (122 kg)  OGTT: high FBG and positive Hx  Estimated Date of Delivery: 12/15/19   Pregnancy #: 2  Previous GDM: Yes   Medications:   Current Outpatient Medications:      acetone, urine, test Strp, Check urine for ketones once per day in the morning., Disp: 50 strip, Rfl: 2     blood glucose meter (GLUCOMETER), Use 1 each As Directed as needed. Dispense glucometer brand per patient's insurance at pharmacy discretion., Disp: 1 each, Rfl: 0     blood glucose test (GLUCOSE BLOOD) strips, Use 1 each As Directed 4 (four) times a day. Dispense brand per patient's insurance at pharmacy discretion., Disp: 400 strip, Rfl: 0     cholecalciferol, vitamin D3, 1,000 unit tablet, Take 4,000 Units by mouth daily., Disp: , Rfl:      generic lancets, Check blood sugar 4 times per day. Microlet lancets., Disp: 400 each, Rfl: 0     insulin glargine (LANTUS SOLOSTAR U-100 INSULIN) 100 unit/mL (3 mL) pen, Inject 8 units at bedtime, increase as instructed; up to 40 units daily (Patient taking differently: 22 Units. Inject 8 units at bedtime, increase as instructed; up to 40 units daily   ), Disp: 36 mL, Rfl: 0     pen needle, diabetic 32 gauge x 5/32\" Ndle, Use 1 each As Directed daily., Disp: 100 " each, Rfl: 2     prenatal vitamin iron-folic acid 27mg-0.8mg (PRENATAL S) 27 mg iron- 800 mcg Tab tablet, Take 1 tablet by mouth daily., Disp: , Rfl:       BG monitoring goals: Fasting <95; 1 hour post start of meal <140. Test 4 x per day.  Check fasting a.m. ketones: Yes  GDM meal pattern/carb counting taught per guidelines: Yes    Past Goals:  Nutrition: GDM meal plan -Met  Activity: Walking after meals when able/staying active -Met  Monitoring: BG 4x/day as directed, ketones every morning -Met      New Goals:  Nutrition: GDM meal plan   Activity: Walking after meals when able/staying active   Monitoring: BG 4x/day as directed, ketones every morning    DIABETES CARE PLAN AND EDUCATION RECORD    Gestational Diabetes Disease Process/Preconception Care/Management During Pregnancy/Postpartum:Discussed    Meter (per above goals): Assessed and Discussed    Nutrition Management    Weight: Assessed and Discussed  Portions/Balance: Assessed and Discussed  Carb ID/Count: Assessed and Discussed  Label Reading: Assessed and Discussed  Menu Planning: Assessed and Discussed  Dining Out: Assessed and Discussed  Physical Activity: Assessed and Discussed    Acute Complications: Prevent, Detect, Treat:    Goal Setting and Problem Solving: Assessed and Discussed  Barriers: Assessed and Discussed  Psychosocial Adjustments: Assessed and Discussed    I agree with the aforementioned diabetes plan.  Ela WATERS [facility] Endocrinology  10/15/2019  2:38 PM

## 2021-06-02 NOTE — PROGRESS NOTES
[facility]  ENDOCRINOLOGY    Gestational Diabetes 10/21/2019    Nirali Remy, 1990, [id number]          Reason for visit      1. Insulin controlled gestational diabetes mellitus (GDM) in third trimester        HPI     Nirali Remy is a very pleasant 29 y.o. old female who presents for GESTATIONAL Diabetes Mellitus.  She is currently 331w4d pregnant . Due date is 12/15/19 2015  Diagnosed with GDM based on an OGTT. She hashad  GDM in prior pregnancies.   Current carbohydrate intake:consistent with recommendations of 30g-60g-60g.  I have reviewed her blood glucose logs and note that the:  Fasting readings  are:in range on current regimen  Postprandial readings are:in range on current regimen  Current Lantus dose: 32 units  Current Prandial insulin: 0  Blood glucose logs/meter brought in and data reviewed and incorporated into decision-making.  Planned delivery at: [facility]  OBGYN: [facility]    Therapy/Interventions in the past:  She has been seen by the Diabetes Educator- and has received instruction on carbohydrate counting and  consistency.  Records from referring provider and other sources have also been reviewed and incorporated into decision-making.      TODAY:  Nirali is here for the first time after starting insulin for GDM.  She has had it with her first pregnancy, and baby weighed 9 lb, 14 oz.  This was the first time that she could be seen but has been working with CDE to keep numbers in line with protocol.  She did not bring in her glucometer today, but is here with her child.  She reports that her FBS are much better and that her postprandial readings are also good. Baby is currently in the 75th percentile. Baby is moving appropriately and she is having no swelling at present.     Past Medical History       Patient Active Problem List   Diagnosis     Insulin controlled gestational diabetes mellitus (GDM) in third trimester     Pregnant     Postural kyphosis     Low back pain     Sacral pain      "Somatic dysfunction of lumbar region     Somatic dysfunction of sacral region     Thoracic segment dysfunction        Past Surgical History     Past Surgical History:   Procedure Laterality Date     CHOLECYSTECTOMY  2012       Family History     Family History   Problem Relation Age of Onset     No Medical Problems Mother      No Medical Problems Father      No Medical Problems Sister      No Medical Problems Brother        Social History     Social History     Tobacco Use     Smoking status: Never Smoker     Smokeless tobacco: Never Used   Substance Use Topics     Alcohol use: No     Comment: Social drinker, until pregnancy.     Drug use: No       Review of Systems     Patient has no polyuria or polydipsia, no chest pain, dyspnea or TIA's, no numbness, tingling or pain in extremities  Remainder negative except as noted in HPI.      Vital Signs     /62 (Patient Site: Right Arm, Patient Position: Sitting, Cuff Size: Adult Regular)   Pulse 90   Ht 5' 7\" (1.702 m)   Wt (!) 269 lb 9.6 oz (122.3 kg)   BMI 42.23 kg/m    Wt Readings from Last 3 Encounters:   10/17/19 (!) 269 lb 9.6 oz (122.3 kg)   10/10/19 (!) 269 lb (122 kg)   09/18/19 (!) 267 lb (121.1 kg)       Physical Exam     GENERAL: Pleasant, alert, appropriate appearance for age. No acute distress,     HEENT: Normocephalic, atraumatic  NECK: Supple, no masses or  lymph nodes.  THYROID: No nodules or enlargement. Non-tender, no bruit  CHEST/RESPIRATORY: Normal chest wall and respirations. Clear to auscultation.  CARDIOVASCULAR: Regular rate and rhythm. S1, S2, no murmur, click, gallop, or rubs.  ABDOMEN: Gravid   LYMPHATIC: No palpable nodes in neck, supraclavicular,  SKIN: No melanosis,  ecchymosis,  vitiligo. No acanthosis nigricans  NEURO:  Non-focal, normal DTRs; no tremor.  PSYCH: Alert and oriented -appropriate affect. Orientation, judgement and memory appear intact.  MSK: No joint abnormalities, FROM in all four extremities. No " kyphosis    Assessment     1. Insulin controlled gestational diabetes mellitus (GDM) in third trimester        Plan     1. GESTATIONAL DIABETES-  Adjust dose as follows:    -Lantus [id number]   units. Increase by 2 units every 2 days to keep fasting blood glucose below 95mg/dL  -Novolog 0  units with breakfast  -Novolog 0 units with lunch   -Novolog 0 units with dinner  -Increase by 0 units every 2 days to keep 1 hour after meal blood glucose less than 140mg/dL    We reviewed glucose goals of fasting blood glucose <95 mg/dL and 1 hour post prandial blood glucose of <140 mg/dL.    Monitor blood sugar 4 times daily: Fasting  and 1 hour after each meal.  Contact  this clinic [phone] if blood glucose is not within the above-mentioned goals.     We discussed the importance of excellent glycemic control during pregnancy to limit complications such as fetal macrosomia, shoulder dystocia,  hypoglycemia and hyperbilirubinemia.  I have discussed the patient's increased risk of recurrent GDM and/or development of type 2 diabetes later in life.      She will get with CDE this week for f/u and see me in two weeks. Time spent with pt today: 25 min with >50% spent in counseling and coordination of care.        Ela Lockhart  10/21/2019      Lab Results     Creatinine   Date Value Ref Range Status   2018 0.64 0.60 - 1.10 mg/dL Final       No results found for: CHOL, HDL, LDLCALC, TRIG    Lab Results   Component Value Date    ALT 37 2018    AST 26 2018    ALKPHOS 70 2018    BILITOT 1.4 (H) 2018         Current Medications     Outpatient Medications Prior to Visit   Medication Sig Dispense Refill     acetone, urine, test Strp Check urine for ketones once per day in the morning. 50 strip 2     blood glucose meter (GLUCOMETER) Use 1 each As Directed as needed. Dispense glucometer brand per patient's insurance at pharmacy discretion. 1 each 0     blood glucose test (GLUCOSE BLOOD) strips Use  "1 each As Directed 4 (four) times a day. Dispense brand per patient's insurance at pharmacy discretion. 400 strip 0     cholecalciferol, vitamin D3, 1,000 unit tablet Take 4,000 Units by mouth daily.       generic lancets Check blood sugar 4 times per day. Microlet lancets. 400 each 0     insulin glargine (LANTUS SOLOSTAR U-100 INSULIN) 100 unit/mL (3 mL) pen Inject 8 units at bedtime, increase as instructed; up to 40 units daily (Patient taking differently: 32 Units. Inject 8 units at bedtime, increase as instructed; up to 40 units daily      ) 36 mL 0     pen needle, diabetic 32 gauge x 5/32\" Ndle Use 1 each As Directed daily. 100 each 2     prenatal vitamin iron-folic acid 27mg-0.8mg (PRENATAL S) 27 mg iron- 800 mcg Tab tablet Take 1 tablet by mouth daily.       No facility-administered medications prior to visit.        "

## 2021-06-03 VITALS — HEIGHT: 67 IN | WEIGHT: 267 LBS | BODY MASS INDEX: 41.91 KG/M2

## 2021-06-03 VITALS
HEART RATE: 90 BPM | WEIGHT: 269.6 LBS | BODY MASS INDEX: 42.31 KG/M2 | SYSTOLIC BLOOD PRESSURE: 104 MMHG | DIASTOLIC BLOOD PRESSURE: 62 MMHG | HEIGHT: 67 IN

## 2021-06-03 VITALS — BODY MASS INDEX: 42.13 KG/M2 | WEIGHT: 269 LBS

## 2021-06-03 VITALS — BODY MASS INDEX: 41.82 KG/M2 | WEIGHT: 267 LBS

## 2021-06-04 NOTE — TELEPHONE ENCOUNTER
OB Follow Up Phone Call    Patient: Nirali Remy  : 1990  MRN: [id number]     Location [facility]  Provider Oziel Nina MD      :   N/A    Language:   English    Discharge Follow-Up:  Follow-Up call by Outreach nurse: Message left for patient    Type of Delivery:      Feeding Method:  Breastfeeding    Comments:   Left message with [facility] Outreach phone number for patient to call back if desired. Reminded patient to schedule postpartum follow-up appointment as directed by PCP at discharge.  Encouraged patient to call clinic/PCP with questions or concerns.    Completed by:   Юлия Coronel RN      Patient: Nirali Remy  : 1990  MRN: [id number]

## 2021-06-14 NOTE — PROGRESS NOTES
Assessment/Plan:     1. Routine health maintenance  Decline flu vaccination and Tdap.  Recommend pap with OB.     2. Pregnancy confirmed by positive urine test  Urine pregnancy confirms pregnancy.  Based on her estimated LMP of 10/1/17, she is 8 weeks 2 days along.  Her due date is estimated to be July 8th.  Referral placed for OB/GYN to establish care and likely We deferred a pap exam today per patient preference, as she will be following with an OB.  She also declines a Tdap today, which she can plan on getting in her 3rd trimester.  Due to early spotting, I would like her to see the OB next week.  We discussed symptoms that would warrant emergent care including a large amount of vaginal bleeding, abdominal cramping, dizziness/lightheadedness, or fever.  Recommend that patient start a prenatal vitamin.  She can get this OTC; ensure there is folic acid and iron in the supplement.  All other questions were answered to the best of my ability.   - Pregnancy (Beta-hCG, Qual), Urine  - Ambulatory referral to Obstetrics / Gynecology    3. Spotting in early pregnancy      Subjective:   Nirali Remy  is a 27 y.o. female accompanied by her  who presents for a physical  Patient mainly presents today to confirm pregnancy.  Her estimated LMP was 10/1/17, although her period was much lighter than normal.  Patient has taken several at-home pregnancy tests with positive results.  Associated symptoms include fatigue, breast tenderness and nausea.  Denies vomiting.  She has lost ~10 pounds due to low appetite.  Patient endorses light vaginal bleeding with wiping on 3 separate occasions.  Denies fever, abdominal cramping, lightheadedness, or dysuria.  This is patient's first pregnancy.  The pregnancy is unplanned, however, her and her  are excited about the news.    Patient is not currently on any medications.  She denies any chronic health conditions.  Patient works as a research assistant.       Healthy Habits:  "  Regular Exercise: gym  Sunscreen Use: sometimes  Healthy Diet: yes  Dental Visits Regularly: no  Seat Belt: yes  Sexually active: Yes  Self Breast Exam Monthly: no  Guns at Home: none      There is no immunization history on file for this patient.    Immunization status: up to date and documented.    Gynecologic History  LMP: 10/1/2017  Contraception: none  Last Pap: N/A  Last mammogram: N/A    OB History      Para Term  AB Living    1         SAB TAB Ectopic Multiple Live Births                  No current outpatient prescriptions on file.     No current facility-administered medications for this visit.        History reviewed. No pertinent past medical history.  Past Surgical History:   Procedure Laterality Date     CHOLECYSTECTOMY        No Known Allergies  Family History   Problem Relation Age of Onset     No Medical Problems Mother      No Medical Problems Father      No Medical Problems Sister      No Medical Problems Brother      Social History     Social History     Marital status:      Spouse name: N/A     Number of children: N/A     Years of education: N/A     Occupational History     Not on file.     Social History Main Topics     Smoking status: Never Smoker     Smokeless tobacco: Never Used     Alcohol use Not on file      Comment: occasionally     Drug use: No     Sexual activity: Yes     Partners: Male     Other Topics Concern     Not on file     Social History Narrative        Review of Systems  Fourteen point review of systems negative except as mentioned in HPI    Objective:      Vitals:    17 1550   BP: 122/60   Patient Site: Right Arm   Patient Position: Sitting   Cuff Size: Adult Large   Pulse: 68   Weight: (!) 253 lb 9.6 oz (115 kg)   Height: 5' 6.25\" (1.683 m)     Body mass index is 40.62 kg/(m^2).    Physical Exam:  General Appearance: Alert, cooperative, no distress, appears stated age  Head: Normocephalic, without obvious abnormality, atraumatic  Eyes: PERRL, " conjunctiva/corneas clear, EOM's intact  Ears: Normal TM's and external ear canals, both ears  Throat: Lips, mucosa, and tongue normal; teeth and gums normal  Neck: Supple, symmetrical, trachea midline, no adenopathy;  thyroid: not enlarged, symmetric, no tenderness/mass/nodules; no carotid bruit or JVD  Back: Symmetric, no curvature, ROM normal, no CVA tenderness  Lungs: Clear to auscultation bilaterally, respirations unlabored  Breasts: No breast masses, tenderness, asymmetry, or nipple discharge.  Heart: Regular rate and rhythm, S1 and S2 normal, no murmur, rub, or gallop  Abdomen: Soft, non-tender, bowel sounds active all four quadrants,  no masses, no organomegaly  Pelvic:Not examined  Extremities: Extremities normal, atraumatic, no cyanosis or edema  Skin: Skin color, texture, turgor normal, no rashes or lesions  Lymph nodes: Cervical, supraclavicular, and axillary nodes normal  Neurologic: Normal     Mima Bell, NP-C

## 2021-06-17 NOTE — PROGRESS NOTES
"[facility] GDM Care Plan    Assessment: \"Aleida\" is here with her , Andreas, for initial education for Gestational Diabetes.  This is her first pregnancy.  All questions and concerns addressed today.  They are having a girl.  Per protocol, testing supplies approved by Ela Lockhart CNP.      Plan:    Provider: Dr. LILLY Nina   Provider's Diagnosis (per referral form): Gestational (648.83)    Weight: (!) 258 lb (117 kg)  OGTT: 99/190/--/--  EDC: Estimated Date of Delivery: 18, girl  Pregnancy #: 1  Previous GDM: No  Medications:   Current Outpatient Prescriptions:      cholecalciferol, vitamin D3, 1,000 unit tablet, Take 4,000 Units by mouth daily., Disp: , Rfl:      prenatal vitamin iron-folic acid 27mg-0.8mg (PRENATAL S) 27 mg iron- 800 mcg Tab tablet, Take 1 tablet by mouth daily., Disp: , Rfl:      acetone, urine, test Strp, Use 1 strip each morning to check ketones, Disp: 100 strip, Rfl: 1     blood glucose test (ONETOUCH VERIO) strips, Use 1 each As Directed 4 (four) times a day., Disp: 200 strip, Rfl: 2     dimenhyDRINATE (DRAMAMINE) 50 MG tablet, Take 1 tablet (50 mg total) by mouth every 4 (four) hours as needed (nausea and vomiting)., Disp: 30 tablet, Rfl: 0     lancets (ONETOUCH DELICA LANCETS) 30 gauge Misc, Use 1 each As Directed 4 (four) times a day., Disp: 100 each, Rfl: 2  Supplements: Yes  PNV: Yes  Meter: One-Touch Verio meter  B about a couple hours after having yogurt  BG monitoring goals: Fasting <95; 1 hour post start of meal <140. Test 4 x per day.  Check fasting a.m. ketones: Yes  GDM meal pattern/carb counting taught per guidelines: Yes  Goals: Nutrition: GDM meal plan  Activity:  Walking after meals when able/staying active  Monitoring:  BG 4x/day as directed, ketones every morning    F/u in 1 week to assess BG and food log     DIABETES CARE PLAN AND EDUCATION RECORD    Gestational Diabetes Disease Process/Preconception Care/Management During Pregnancy/Postpartum:    Meter (per " above goals): Discussed, Literature provided and Patient returned demonstration    Nutrition Management    Weight: Assessed, Discussed and Literature provided  Portions/Balance: Assessed, Discussed and Literature provided  Carb ID/Count: Assessed, Discussed and Literature provided  Label Reading: Assessed, Discussed and Literature provided  Menu Planning: Assessed, Discussed and Literature provided  Dining Out: Assessed, Discussed and Literature provided  Physical Activity: Discussed and Literature provided    Acute Complications: Prevent, Detect, Treat:    Goal Setting and Problem Solving: Assessed and Discussed  Barriers: Assessed and Discussed  Psychosocial Adjustments: Assessed and Discussed      Time: 60 minutes  Visit Type: GDM Individual  Visit #: 1      I agree with the aforementioned diabetes plan.  Ela WATERS [facility] Endocrinology  5/8/2018  2:31 PM

## 2021-06-18 NOTE — PROGRESS NOTES
[facility] Gestational Diabetes Care Plan    Assessment: Nirali is here with her  today for follow-up.  Stated things have been going pretty well, that she has been struggling with her fasting numbers and ketones.  Stated she is learning how to balance her meals better each day.  Reviewed her readings, all breakfast and lunch readings are within range.  Her dinner readings are running a little lower than I would like, .  Most mornings her fasting readings have been high, , with moderate-large ketones.  She had 1 day of small, this was her lowest result.  Discussed increasing carb intake at dinner by 15 grams and seeing if this helps with ketones and fasting readings.  Discussed possibility of starting insulin next week so she is aware this could happen.    Plan: Nirali will add more carb to her dinner and see if this improves her readings.  Discussed possible complications of continued high glucose for her and her baby.  She will follow-up next week after her ultrasound and appointment with Dr. Nina.    Provider: Dr. Nina  Provider's Diagnosis (per referral form) Gestational Diabetes (648.83)    Weight: (!) 261 lb 11.2 oz (118.7 kg)  OGTT: 99/190/--/--  EDC: Estimated Date of Delivery: 7/17/18, girl  Pregnancy number: 1  Previous GDM: No    Medications:   Current Outpatient Prescriptions:      acetone, urine, test Strp, Use 1 strip each morning to check ketones, Disp: 100 strip, Rfl: 1     blood glucose test (ONETOUCH VERIO) strips, Use 1 each As Directed 4 (four) times a day., Disp: 200 strip, Rfl: 2     cholecalciferol, vitamin D3, 1,000 unit tablet, Take 4,000 Units by mouth daily., Disp: , Rfl:      dimenhyDRINATE (DRAMAMINE) 50 MG tablet, Take 1 tablet (50 mg total) by mouth every 4 (four) hours as needed (nausea and vomiting)., Disp: 30 tablet, Rfl: 0     lancets (ONETOUCH DELICA LANCETS) 30 gauge Misc, Use 1 each As Directed 4 (four) times a day., Disp: 100 each, Rfl: 2      prenatal vitamin iron-folic acid 27mg-0.8mg (PRENATAL S) 27 mg iron- 800 mcg Tab tablet, Take 1 tablet by mouth daily., Disp: , Rfl:   PNV: Yes  Supplements: Yes    BG monitoring goals: Fasting <95; 1 hour post start of meal <140. Test 4 x per day.  Check fasting a.m. ketones: Yes  GDM meal pattern/carb counting taught per guidelines: Yes    Time: 30 minutes DSMT  Visit Type: GDM Individual Follow-up  Visit #: 2      Jessica Roe  5/17/2018    DIABETES CARE PLAN AND EDUCATION RECORD    Gestational Diabetes Disease Process/Preconception Care/Management During Pregnancy/Postpartum:Assessed and Discussed    Meter (per above goals): Assessed and Discussed    Nutrition Management    Weight: Assessed and Discussed  Portions/Balance: Assessed and discussed  Carb ID/Count: Assessed and discussed  Label Reading: Assessed and discussed  Menu Planning: Assessed and Discussed  Dining Out: Assessed and Discussed  Physical Activity: Assessed and Discussed  Medications: Assessed and Discussed    Acute Complications: Prevent, Detect, Treat:      Hyperglycemia: Assessed and Discussed  Goal Setting and Problem Solving: Assessed and Discussed  Barriers: Assessed and Discussed  Psychosocial Adjustments: Assessed and Discussed

## 2021-06-18 NOTE — PROGRESS NOTES
[facility] Gestational Diabetes Care Plan    Assessment: Nirali is here with her  for follow-up today.  They had an ultrasound and meeting with their doctor today.  Stated baby weighs 5lbs 10oz and has gained about 2 pounds in the last month.  She continues to have trouble with fasting readings and ketones, highs noted below:  F-99/108/101/116  B-164/141  L-143  D-145  Ketones-moderate-large, one day of small over the last week.  Stated she is following the meal plan, does not feel hungry during her day and is eating every 3-4 hours.  At this point unsure for the ketone levels and discussed I will talk with endocrinology tomorrow and call her then.  Discussed starting insulin and she is ready today.  Reviewed administration technique, site selection, site rotation, dosing, timing of insulin, storage, sharps disposal and hypoglycemia signs and treatment.       Plan: Nirali will start taking 8 units of NPH tonight.  Instructed to call Monday if FBG are still high over the weekend.  She is scheduled to meet with endocrinology next week.    Provider: Dr. LILLY Nina  Provider's Diagnosis (per referral form) Gestational Diabetes (648.83)    Weight: (!) 262 lb 6.4 oz (119 kg)  OGTT: No results found for this or any previous visit.  EDC: Estimated Date of Delivery: 7/17/18, girl  Pregnancy number: 1  Previous GDM: No    Medications:   Current Outpatient Prescriptions:      acetone, urine, test Strp, Use 1 strip each morning to check ketones, Disp: 100 strip, Rfl: 1     blood glucose test (ONETOUCH VERIO) strips, Use 1 each As Directed 4 (four) times a day., Disp: 200 strip, Rfl: 2     cholecalciferol, vitamin D3, 1,000 unit tablet, Take 4,000 Units by mouth daily., Disp: , Rfl:      dimenhyDRINATE (DRAMAMINE) 50 MG tablet, Take 1 tablet (50 mg total) by mouth every 4 (four) hours as needed (nausea and vomiting)., Disp: 30 tablet, Rfl: 0     lancets (ONETOUCH DELICA LANCETS) 30 gauge Misc, Use 1 each As Directed  "4 (four) times a day., Disp: 100 each, Rfl: 2     prenatal vitamin iron-folic acid 27mg-0.8mg (PRENATAL S) 27 mg iron- 800 mcg Tab tablet, Take 1 tablet by mouth daily., Disp: , Rfl:      HUMULIN N NPH INSULIN KWIKPEN 100 unit/mL (3 mL) pen, Inject 8 Units under the skin at bedtime., Disp: 3 mL, Rfl: 1     pen needle, diabetic 32 gauge x 5/32\" Ndle, Use 1 each As Directed daily., Disp: 100 each, Rfl: 1  PNV: Yes  Supplements: Yes    BG monitoring goals: Fasting <95; 1 hour post start of meal <140. Test 4 x per day.  Check fasting a.m. ketones: Yes  GDM meal pattern/carb counting taught per guidelines: Yes    Time: 30 minutes DSMT  Visit Type: GDM Individual Follow-up  Visit #: 3      Jessica Roe  5/24/2018    DIABETES CARE PLAN AND EDUCATION RECORD    Gestational Diabetes Disease Process/Preconception Care/Management During Pregnancy/Postpartum:Assessed and Discussed    Meter (per above goals): Assessed and Discussed    Nutrition Management    Weight: Assessed and Discussed  Portions/Balance: Assessed and discussed  Carb ID/Count: Assessed and discussed  Label Reading: Assessed and discussed  Menu Planning: Assessed and Discussed  Dining Out: Assessed and Discussed  Physical Activity: Assessed and Discussed  Medications: Assessed and Discussed    Acute Complications: Prevent, Detect, Treat:      Hyperglycemia: Assessed and Discussed  Goal Setting and Problem Solving: Assessed and Discussed  Barriers: Assessed and Discussed  Psychosocial Adjustments: Assessed and Discussed        I agree with the aforementioned diabetes plan.  Ela WATERS [facility] Endocrinology  5/25/2018  6:19 AM    "

## 2021-06-18 NOTE — PROGRESS NOTES
"[facility] GDM Care Plan      Assessment/Plan: pt seen today for pregnancy clinic. She was 20 mins late for appt so she was not able to see Irina today.  is present. She is still having sm-lrg ketones. She is eating all meals and snacks. Feels she is getting plenty to eat. Usually having ice cream at HS snack. Discussed adding protein to help with ketones.   She is taking 8 units at HS. Most FBG are slightly above 95. Will increase to 10 units tonight and start increasing by 2 units every 2 readings >95. This was all written down for pt and she verbalized understanding. She has not had any low BG. Reviewed signs and symptoms of hypoglycemia and treatment.   Pp readings are all WNL.   Pt will f/u in 2 weeks, call sooner with concerns.         Time: 30  Visit Type: pregnancy clinic   Provider: Maico  Provider's Diagnosis (per referral form): Gestational (648.83)    Weight: (!) 260 lb (117.9 kg)  OGTT: No results found for this or any previous visit.   Estimated Date of Delivery: 7/17/18   Pregnancy #: 1  Previous GDM: No   Medications:   Current Outpatient Prescriptions:      acetone, urine, test Strp, Use 1 strip each morning to check ketones, Disp: 100 strip, Rfl: 1     blood glucose test (ONETOUCH VERIO) strips, Use 1 each As Directed 4 (four) times a day., Disp: 200 strip, Rfl: 2     cholecalciferol, vitamin D3, 1,000 unit tablet, Take 4,000 Units by mouth daily., Disp: , Rfl:      dimenhyDRINATE (DRAMAMINE) 50 MG tablet, Take 1 tablet (50 mg total) by mouth every 4 (four) hours as needed (nausea and vomiting)., Disp: 30 tablet, Rfl: 0     HUMULIN N NPH INSULIN KWIKPEN 100 unit/mL (3 mL) pen, Inject 8 Units under the skin at bedtime., Disp: 3 mL, Rfl: 1     lancets (ONETOUCH DELICA LANCETS) 30 gauge Misc, Use 1 each As Directed 4 (four) times a day., Disp: 100 each, Rfl: 2     pen needle, diabetic 32 gauge x 5/32\" Ndle, Use 1 each As Directed daily., Disp: 100 each, Rfl: 1     prenatal vitamin iron-folic acid " 27mg-0.8mg (PRENATAL S) 27 mg iron- 800 mcg Tab tablet, Take 1 tablet by mouth daily., Disp: , Rfl:       BG monitoring goals: Fasting <95; 1 hour post start of meal <140. Test 4 x per day.  Check fasting a.m. ketones: Yes  GDM meal pattern/carb counting taught per guidelines: Yes    Past Goals:  Nutrition: GDM meal plan MET  Activity: Walking after meals when able/staying active MET  Monitoring: BG 4x/day as directed, ketones every morning MET      New Goals:  Nutrition: GDM meal plan   Activity: Walking after meals when able/staying active   Monitoring: BG 4x/day as directed, ketones every morning    DIABETES CARE PLAN AND EDUCATION RECORD    Gestational Diabetes Disease Process/Preconception Care/Management During Pregnancy/Postpartum:Discussed    Meter (per above goals): Discussed    Nutrition Management    Weight: Assessed and Discussed  Portions/Balance: Assessed and Discussed  Carb ID/Count: Assessed and Discussed  Label Reading: Assessed and Discussed  Menu Planning: Assessed and Discussed  Dining Out: Assessed and Discussed  Physical Activity: Assessed and Discussed    Acute Complications: Prevent, Detect, Treat:    Goal Setting and Problem Solving: Assessed and Discussed  Barriers: Assessed and Discussed  Psychosocial Adjustments: Assessed and Discussed      I agree with the aforementioned diabetes plan.  Ela WATERS [facility] Endocrinology  5/31/2018  9:43 AM

## 2021-06-18 NOTE — PROGRESS NOTES
[facility]  ENDOCRINOLOGY    Gestational Diabetes 2018    Nirali Remy, 1990, [id number]          Reason for visit      1. Insulin controlled gestational diabetes mellitus (GDM) in third trimester        HPI     Nirali Remy is a very pleasant 27 y.o. old female who presents for GESTATIONAL Diabetes Mellitus.  She is currently 35w2d pregnant . Due date is 18  Diagnosed with GDM based on an OGTT.    Current carbohydrate intake:consistent with recommendations of 30g-60g-60g.  I have reviewed her blood glucose logs and note that the:  Fasting readings  are:in range on current regimen  Postprandial readings are:in range on current regimen  Current NPH dose: 14u  Current Prandial insulin:0   Blood glucose logs/meter brought in and data reviewed and incorporated into decision-making.  [facility]    Therapy/Interventions in the past:  She has been seen by the Diabetes Educator- and has received instruction on carbohydrate counting and  consistency.  Records from referring provider and other sources have also been reviewed and incorporated into decision-making.      TODAY:  Nirali is here today for the first time after starting insulin.  She is here with her significant other.  She has brought in her log book and most of her numbers are within range. She is complaining about some sensations of dizziness, in spite of not having any recorded lows.  She states that she is eating regularly and her ketones have improved.  Baby is moving appropriately.  She is having swelling in her hands and feet.   has no current concerns. All questions answered to her satisfaction.     Past Medical History       Patient Active Problem List   Diagnosis     Insulin controlled gestational diabetes mellitus (GDM) in third trimester        Past Surgical History     Past Surgical History:   Procedure Laterality Date     CHOLECYSTECTOMY         Family History     Family History   Problem  "Relation Age of Onset     No Medical Problems Mother      No Medical Problems Father      No Medical Problems Sister      No Medical Problems Brother        Social History     Social History   Substance Use Topics     Smoking status: Never Smoker     Smokeless tobacco: Never Used     Alcohol use No      Comment: Social drinker, until pregnancy.       Review of Systems     Patient has no polyuria or polydipsia, no chest pain, dyspnea or TIA's, no numbness, tingling or pain in extremities  Remainder negative except as noted in HPI.      Vital Signs     /72 (Patient Site: Right Arm, Patient Position: Sitting, Cuff Size: Adult Regular)  Pulse 90  Ht 5' 6.25\" (1.683 m)  Wt (!) 258 lb 6.4 oz (117.2 kg)  LMP 10/01/2017 (Approximate)  BMI 41.39 kg/m2  Wt Readings from Last 3 Encounters:   06/14/18 (!) 258 lb 6.4 oz (117.2 kg)   05/31/18 (!) 260 lb (117.9 kg)   05/24/18 (!) 262 lb 6.4 oz (119 kg)       Physical Exam     GENERAL: Pleasant, alert, appropriate appearance for age. No acute distress,   HEENT: Normocephalic, atraumatic  NECK: Supple, no masses or  lymph nodes.  THYROID: No nodules or enlargement. Non-tender, no bruit  CHEST/RESPIRATORY: Normal chest wall and respirations. Clear to auscultation.  CARDIOVASCULAR: Regular rate and rhythm. S1, S2, no murmur, click, gallop, or rubs.  ABDOMEN: Gravid   LYMPHATIC: No palpable nodes in neck, supraclavicular,  SKIN: No melanosis,  ecchymosis,  vitiligo. No acanthosis nigricans  NEURO:  Non-focal, normal DTRs; no tremor.  PSYCH: Alert and oriented -appropriate affect. Orientation, judgement and memory appear intact.  MSK: No joint abnormalities, FROM in all four extremities. No kyphosis    Assessment     1. Insulin controlled gestational diabetes mellitus (GDM) in third trimester        Plan     1. GESTATIONAL DIABETES-  Adjust dose as follows:    -NPH [id number]   units. Increase by 2 units every 2 days to keep fasting blood glucose below 95mg/dL  -Novolog 0  " "units with breakfast  -Novolog 0 units with lunch   -Novolog 0 units with dinner  -Increase by 0 units every 2 days to keep 1 hour after meal blood glucose less than 140mg/dL    We reviewed glucose goals of fasting blood glucose <95 mg/dL and 1 hour post prandial blood glucose of <140 mg/dL.    Monitor blood sugar 4 times daily: Fasting  and 1 hour after each meal.  Contact  this clinic [phone] if blood glucose is not within the above-mentioned goals.     We discussed the importance of excellent glycemic control during pregnancy to limit complications such as fetal macrosomia, shoulder dystocia,  hypoglycemia and hyperbilirubinemia.  I have discussed the patient's increased risk of recurrent GDM and/or development of type 2 diabetes later in life.      She will f/u with us in 2 weeks. Time spent with pt today: 25 min with >50% spent in counseling and coordination of care.          Ela Lockhart  2018      Lab Results     Creatinine   Date Value Ref Range Status   2018 0.64 0.60 - 1.10 mg/dL Final       No results found for: CHOL, HDL, LDLCALC, TRIG    Lab Results   Component Value Date    ALT 37 2018    AST 26 2018    ALKPHOS 70 2018    BILITOT 1.4 (H) 2018         Current Medications     Outpatient Medications Prior to Visit   Medication Sig Dispense Refill     acetone, urine, test Strp Use 1 strip each morning to check ketones 100 strip 1     blood glucose test (ONETOUCH VERIO) strips Use 1 each As Directed 4 (four) times a day. 200 strip 2     cholecalciferol, vitamin D3, 1,000 unit tablet Take 4,000 Units by mouth daily.       HUMULIN N NPH INSULIN KWIKPEN 100 unit/mL (3 mL) pen Inject 8 Units under the skin at bedtime. (Patient taking differently: Inject 14 Units under the skin at bedtime. ) 3 mL 1     lancets (ONETOUCH DELICA LANCETS) 30 gauge Misc Use 1 each As Directed 4 (four) times a day. 100 each 2     pen needle, diabetic 32 gauge x \" Ndle Use 1 each " As Directed daily. 100 each 1     prenatal vitamin iron-folic acid 27mg-0.8mg (PRENATAL S) 27 mg iron- 800 mcg Tab tablet Take 1 tablet by mouth daily.       dimenhyDRINATE (DRAMAMINE) 50 MG tablet Take 1 tablet (50 mg total) by mouth every 4 (four) hours as needed (nausea and vomiting). 30 tablet 0     No facility-administered medications prior to visit.

## 2021-06-18 NOTE — PROGRESS NOTES
"[facility] GDM Care Plan      Assessment/Plan: Pt seen today for f/u. Spoke with her earlier this week on 6/12/18. Most all BG are WNL. She does not have any low BG readings but reports symptoms of dizziness. Pt is seeing her OB later today. She does note the last day or 2 has seemed better. She has rare elevations, not often. Currently taking 14 units at HS. Will continue with this.   Ketones are slightly improved, a couple trace but mostly small-mod. Discussed adding more to diet to help with ketones as well as what she feels to be low BG at pp 100.   Pt will call prior to f/u in 2 weeks with any concerns.       Time: 30  Visit Type: pregnancy clinic   Provider: Maico   Provider's Diagnosis (per referral form): Gestational (648.83)  OGTT: No results found for this or any previous visit.   Estimated Date of Delivery: 7/17/18   Pregnancy #: 1  Previous GDM: No   Medications:   Current Outpatient Prescriptions:      acetone, urine, test Strp, Use 1 strip each morning to check ketones, Disp: 100 strip, Rfl: 1     blood glucose test (ONETOUCH VERIO) strips, Use 1 each As Directed 4 (four) times a day., Disp: 200 strip, Rfl: 2     cholecalciferol, vitamin D3, 1,000 unit tablet, Take 4,000 Units by mouth daily., Disp: , Rfl:      HUMULIN N NPH INSULIN KWIKPEN 100 unit/mL (3 mL) pen, Inject 8 Units under the skin at bedtime. (Patient taking differently: Inject 14 Units under the skin at bedtime. ), Disp: 3 mL, Rfl: 1     lancets (ONETOUCH DELICA LANCETS) 30 gauge Misc, Use 1 each As Directed 4 (four) times a day., Disp: 100 each, Rfl: 2     pen needle, diabetic 32 gauge x 5/32\" Ndle, Use 1 each As Directed daily., Disp: 100 each, Rfl: 1     prenatal vitamin iron-folic acid 27mg-0.8mg (PRENATAL S) 27 mg iron- 800 mcg Tab tablet, Take 1 tablet by mouth daily., Disp: , Rfl:       BG monitoring goals: Fasting <95; 1 hour post start of meal <140. Test 4 x per day.  Check fasting a.m. ketones: Yes  GDM meal pattern/carb counting " taught per guidelines: Yes    Past Goals:  Nutrition: GDM meal plan MET  Activity: Walking after meals when able/staying active MET  Monitoring: BG 4x/day as directed, ketones every morning MET      New Goals:  Nutrition: GDM meal plan   Activity: Walking after meals when able/staying active   Monitoring: BG 4x/day as directed, ketones every morning    DIABETES CARE PLAN AND EDUCATION RECORD    Gestational Diabetes Disease Process/Preconception Care/Management During Pregnancy/Postpartum:Discussed    Meter (per above goals): Discussed    Nutrition Management    Weight: Assessed and Discussed  Portions/Balance: Assessed and Discussed  Carb ID/Count: Assessed and Discussed  Label Reading: Assessed and Discussed  Menu Planning: Assessed and Discussed  Dining Out: Assessed and Discussed  Physical Activity: Assessed and Discussed    Acute Complications: Prevent, Detect, Treat:    Goal Setting and Problem Solving: Assessed and Discussed  Barriers: Assessed and Discussed  Psychosocial Adjustments: Assessed and Discussed    I agree with the aforementioned diabetes plan.  Ela WATERS [facility] Endocrinology  6/14/2018  10:25 AM

## 2021-07-03 NOTE — ADDENDUM NOTE
Addendum Note by Marii Paris RN at 9/13/2019  9:29 AM     Author: Marii Paris RN Service: -- Author Type: Registered Nurse    Filed: 9/13/2019  9:29 AM Encounter Date: 9/12/2019 Status: Signed    : Marii Paris RN (Registered Nurse)    Addended by: MARII PARIS on: 9/13/2019 09:29 AM        Modules accepted: Orders

## 2021-07-14 PROBLEM — Z34.90 PREGNANT: Status: RESOLVED | Noted: 2018-07-10 | Resolved: 2019-12-10

## 2021-10-10 ENCOUNTER — HEALTH MAINTENANCE LETTER (OUTPATIENT)
Age: 31
End: 2021-10-10

## 2022-05-22 ENCOUNTER — HEALTH MAINTENANCE LETTER (OUTPATIENT)
Age: 32
End: 2022-05-22

## 2022-09-18 ENCOUNTER — HEALTH MAINTENANCE LETTER (OUTPATIENT)
Age: 32
End: 2022-09-18

## 2023-02-09 NOTE — PROGRESS NOTES
"50 minutes spent on the date of the encounter doing chart review, history and exam, documentation and further activities per the note    New Bariatric Surgery Consultation Note    2023    RE: Nirali Remy  MR#: [id number]  : 1990      Referring provider: No flowsheet data found.    Chief Complaint/Reason for visit: evaluation for possible weight loss surgery    Dear No Ref-Primary, Physician (General),    I had the pleasure of seeing your patient, Nirali Remy, to evaluate her obesity and consider her for possible weight loss surgery. As you know, Nirali Remy is 32 year old.  She has a height of 5' 7\", a weight of 298 lbs 0 oz, and calculated Body mass index is 46.67 kg/m ..  Today we discussed our Bariatric Surgery program to address their weight related health. She has has viewed our online seminar prior to this visit and on discussion today, she has decided to focus on non surgical weight loss during this stressful year following divorce and with limited self care time and consider the surgical program next year if non surgical means are unable to produce meaningful reduction in weight/treat her Class III morbid obesity.  She's had some gestational diabetes history in both of her pregnancies (3 and 3 yo kids) and reports A1c of 5.7% on last PCP driven check (not available for my review).     Her insurance doesn't affordably cover Wegovy ($1300/month) and so we discussed option for dual therapy with phentermine and low dose metformin.  Blood pressure and heart exam today were suitable for stimulant based trial and we'll follow up her progress/tolerance through this next year. Consideration for bupropion/naltrexone would be option as well should she not respond well to phentermine/metformin therapy.       .    Assessment & Plan   Problem List Items Addressed This Visit        Digestive    Morbid obesity (H)    Relevant Medications    metFORMIN (GLUCOPHAGE) 500 MG tablet    phentermine " (ADIPEX-P) 37.5 MG tablet   Other Visit Diagnoses     Class 3 severe obesity due to excess calories without serious comorbidity with body mass index (BMI) of 45.0 to 49.9 in adult (H)    -  Primary    Relevant Medications    metFORMIN (GLUCOPHAGE) 500 MG tablet    phentermine (ADIPEX-P) 37.5 MG tablet    Other Relevant Orders    Comprehensive metabolic panel    CBC with platelets    Hemoglobin A1c    25- OH-Vitamin D    Vitamin B12    TSH         Plan:  1. Welcome to the Bariatric Care clinic. We'll focus on a year of non surgical weight loss during this time when you have less social support and to make sure non surgical/medication supported options are exhausted before considering bariatric surgery.    2. Aiming for protein/vegetable rich meals through the day to given 20-25 grams of lean protein every 4.5-6 hours and hydrating well between meals to prevent thirst related snacking. Goal water intake of 64-75oz/day.   Calorie goal to start of 1450-1600kcal/day for now.  Tracking with [employer] or other similar tracking sharan can be very helpful for finding your sweet spot during weight loss and preventing reactive eating.    3. Check labs today, I'll message results in about a week.     4. Follow up with dietician.     5. Start medication support: your insurance doesn't affordably cover Wegovy on investigation today but if your policy changed, you can call your benefits line to see what your copay likely would be.  put Wegovy at $1300/month and would be unaffordable. Likely similar with Saxenda.      As such, we'll ramp up dual therapy with phentermine and low dose metformin.  Start half a tablet of phentermine after breakfast (no later than lunch) and then half a tablet of metformin with supper for 2 weeks and then increase the metformin to one full tablet with supper.     Stop medications if pregnancy occurs or plans to try.     Stop phentermine on sick days.  Stop metformin if severe upset  stomach/vomiting/diarrhea.     6. Continue to find some fitness fun/routine aiming to build up to 3 days of strength work and 90-150minutes weekly of elliptical work by the end of Spring.       HISTORY OF PRESENT ILLNESS:  Weight Loss History Reviewed with Patient 2023   How long have you been overweight? Since late teens through early 20's   What is the most that you have ever weighed? 300   What is the most weight you have lost? 35   I have tried the following methods to lose weight Watching portions or calories, Exercise   I have tried the following weight loss medications? (Check all that apply) None   Have you ever had weight loss surgery? No   Chart review shows weight of 238 lbs in 2016, BMI 38. Discharged from hospital after  that was complicated by gestational diabetes  In 2019 at 272 lbs. Has gained about 26 lbs over the last 3 years.  Has 4 and 3 year old kids.     CO-MORBIDITIES OF OBESITY INCLUDE:     2023   I have the following health issues associated with obesity: Pre-Diabetes   No recent labs in chart. Will get today.    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   Kids not great sleepers, divorce seems to trigger.    Plans to have sister live with her next year and we'll focus on non surgical weight loss this year to optimize opportunity for avoiding surgery when less support at home.    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 Yes   Who do you have in your support network that can be available to help you for the first 2 weeks after surgery? Yes   Who can you count on for support throughout your weight loss surgery journey? Mom and sister   Can you afford 3 meals a day?  Yes   Can you afford 50-60 dollars a month for vitamins? Yes       HABITS:     2/6/2023   How often do you drink alcohol? Monthly or less   If you do drink alcohol, how many drinks might you have in a day? (one drink = 5 oz. wine, 1 can/bottle of beer, 1 shot liquor) 1 or 2   Have you ever used any of the following nicotine products? No   Have you or are you currently using street drugs or prescription strength medication for which you do not have a prescription for? No   Do you have a history of chemical dependency (alcohol or drug abuse)? No         PSYCHOLOGICAL HISTORY:   Psychological History Reviewed With Patient 2/6/2023   Have you ever attempted suicide? Never.   Have you had thoughts of suicide in the past year? No   Have you ever been hospitalized for mental illness or a suicide attempt? Never.   Do you have a history of chronic pain? No   Have you ever been diagnosed with fibromyalgia? No   Are you currently seeing a therapist or counselor?  No   Are you currently seeing a psychiatrist? No       ROS:     2/6/2023   Skin:  None of the above   HEENT: Missing teeth   If you answered yes to missing teeth, please indicate how many: 1   Musculoskeletal: None of the above   Cardiovascular: None of the above   Pulmonary: None of the above   Gastrointestinal: None of the above   Genitourinary: None of the above   Hematological: None of the above   Neurological: None of the above   Female only: Irregular menstrual cycles       EATING BEHAVIORS:      "2/6/2023   Have you or anyone else thought that you had an eating disorder? No   Do you currently binge eat (eat a large amount of food in a short time)? No   Are you an emotional eater? Yes   Do you get up to eat after falling asleep? No   had nexplanon removed.     Stressors: ex  coparents, she has full legal custody.     EXERCISE:     2/6/2023   How often do you exercise? Never   What keeps you from being more active?  I should be more active but I just have not gotten around to it     Hobbies include: gets therapy weekly, her self care is to meditate for 10-15 minutes. Not much working out much lately. Has full gym. Has elliptical (feet bother her on treadmill), can work out for 25-30 minutes. .      MEDICATIONS:  Current Outpatient Medications   Medication Sig Dispense Refill     calcium carbonate (OS-FABIOLA) 500 MG tablet Take 1 tablet by mouth 2 times daily       magnesium 250 MG tablet Take 1 tablet by mouth daily       metFORMIN (GLUCOPHAGE) 500 MG tablet Start half a tablet for 2 weeks, then increase to one full tablet with supper if tolerated. 90 tablet 0     Multiple Vitamin (MULTIVITAMIN ADULT PO)        phentermine (ADIPEX-P) 37.5 MG tablet Start half tablet daily after breakfast. 45 tablet 0     vitamin D3 (CHOLECALCIFEROL) 50 mcg (2000 units) tablet Take 1 tablet by mouth daily         ALLERGIES:  No Known Allergies        PHYSICAL EXAM:  Objective    /78   Ht 1.702 m (5' 7\")   Wt 135.2 kg (298 lb)   BMI 46.67 kg/m    /78   Ht 1.702 m (5' 7\")   Wt 135.2 kg (298 lb)   BMI 46.67 kg/m    Body mass index is 46.67 kg/m .  Physical Exam   GENERAL: Young Black female in NAD. Obese. Ambulatory and in good spirits.   NECK: no adenopathy, no asymmetry, masses, or scars and thyroid normal to palpation  RESP: lungs clear to auscultation - no rales, rhonchi or wheezes  CV: regular rate and rhythm, normal S1 S2, no S3 or S4, no murmur, click or rub, no peripheral edema and peripheral pulses " strong  ABDOMEN: soft, nontender, no hepatosplenomegaly, no masses and bowel sounds normal central adiposity with some pannus.   MS: no gross musculoskeletal defects noted, no edema      In summary, Nirali Remy has Class III obesity with a body mass index of Body mass index is 46.67 kg/m . kg/m2 and the comorbidities stated above. She completed an informational seminar and is a possible candidate for the surgery in the future but given high social upheaval and 2 kids under the age of 5 in her home with no previous attempts at medical supported weight loss, we'll focus this next year on non surgical tools and reconsider surgery next year if not having meaningful results. .       Rajeev Figueroa MD  2/9/2023  10:28 AM

## 2023-02-10 ENCOUNTER — OFFICE VISIT (OUTPATIENT)
Dept: SURGERY | Facility: CLINIC | Age: 33
End: 2023-02-10
Payer: COMMERCIAL

## 2023-02-10 ENCOUNTER — LAB (OUTPATIENT)
Dept: LAB | Facility: CLINIC | Age: 33
End: 2023-02-10
Payer: COMMERCIAL

## 2023-02-10 VITALS
HEIGHT: 67 IN | DIASTOLIC BLOOD PRESSURE: 78 MMHG | WEIGHT: 293 LBS | BODY MASS INDEX: 45.99 KG/M2 | SYSTOLIC BLOOD PRESSURE: 134 MMHG

## 2023-02-10 DIAGNOSIS — E66.01 MORBID OBESITY (H): ICD-10-CM

## 2023-02-10 DIAGNOSIS — E66.813 CLASS 3 SEVERE OBESITY DUE TO EXCESS CALORIES WITHOUT SERIOUS COMORBIDITY WITH BODY MASS INDEX (BMI) OF 45.0 TO 49.9 IN ADULT (H): Primary | ICD-10-CM

## 2023-02-10 DIAGNOSIS — E66.813 CLASS 3 SEVERE OBESITY DUE TO EXCESS CALORIES WITHOUT SERIOUS COMORBIDITY WITH BODY MASS INDEX (BMI) OF 45.0 TO 49.9 IN ADULT (H): ICD-10-CM

## 2023-02-10 DIAGNOSIS — E66.01 CLASS 3 SEVERE OBESITY DUE TO EXCESS CALORIES WITHOUT SERIOUS COMORBIDITY WITH BODY MASS INDEX (BMI) OF 45.0 TO 49.9 IN ADULT (H): Primary | ICD-10-CM

## 2023-02-10 DIAGNOSIS — E66.01 CLASS 3 SEVERE OBESITY DUE TO EXCESS CALORIES WITHOUT SERIOUS COMORBIDITY WITH BODY MASS INDEX (BMI) OF 45.0 TO 49.9 IN ADULT (H): ICD-10-CM

## 2023-02-10 DIAGNOSIS — R79.89 LOW VITAMIN D LEVEL: ICD-10-CM

## 2023-02-10 LAB
ALBUMIN SERPL BCG-MCNC: 4.3 G/DL (ref 3.5–5.2)
ALP SERPL-CCNC: 71 U/L (ref 35–104)
ALT SERPL W P-5'-P-CCNC: 22 U/L (ref 10–35)
ANION GAP SERPL CALCULATED.3IONS-SCNC: 13 MMOL/L (ref 7–15)
AST SERPL W P-5'-P-CCNC: 27 U/L (ref 10–35)
BILIRUB SERPL-MCNC: 1 MG/DL
BUN SERPL-MCNC: 8.5 MG/DL (ref 6–20)
CALCIUM SERPL-MCNC: 9.5 MG/DL (ref 8.6–10)
CHLORIDE SERPL-SCNC: 107 MMOL/L (ref 98–107)
CREAT SERPL-MCNC: 0.86 MG/DL (ref 0.51–0.95)
DEPRECATED HCO3 PLAS-SCNC: 25 MMOL/L (ref 22–29)
ERYTHROCYTE [DISTWIDTH] IN BLOOD BY AUTOMATED COUNT: 11.7 % (ref 10–15)
GFR SERPL CREATININE-BSD FRML MDRD: >90 ML/MIN/1.73M2
GLUCOSE SERPL-MCNC: 99 MG/DL (ref 70–99)
HBA1C MFR BLD: 5.4 % (ref 0–5.6)
HCT VFR BLD AUTO: 43.1 % (ref 35–47)
HGB BLD-MCNC: 14.7 G/DL (ref 11.7–15.7)
MCH RBC QN AUTO: 29.4 PG (ref 26.5–33)
MCHC RBC AUTO-ENTMCNC: 34.1 G/DL (ref 31.5–36.5)
MCV RBC AUTO: 86 FL (ref 78–100)
PLATELET # BLD AUTO: 424 10E3/UL (ref 150–450)
POTASSIUM SERPL-SCNC: 4.5 MMOL/L (ref 3.4–5.3)
PROT SERPL-MCNC: 7.4 G/DL (ref 6.4–8.3)
RBC # BLD AUTO: 5 10E6/UL (ref 3.8–5.2)
SODIUM SERPL-SCNC: 145 MMOL/L (ref 136–145)
TSH SERPL DL<=0.005 MIU/L-ACNC: 1.03 UIU/ML (ref 0.3–4.2)
VIT B12 SERPL-MCNC: 1071 PG/ML (ref 232–1245)
WBC # BLD AUTO: 7.7 10E3/UL (ref 4–11)

## 2023-02-10 PROCEDURE — 85027 COMPLETE CBC AUTOMATED: CPT

## 2023-02-10 PROCEDURE — 80053 COMPREHEN METABOLIC PANEL: CPT

## 2023-02-10 PROCEDURE — 99215 OFFICE O/P EST HI 40 MIN: CPT | Performed by: EMERGENCY MEDICINE

## 2023-02-10 PROCEDURE — 82306 VITAMIN D 25 HYDROXY: CPT

## 2023-02-10 PROCEDURE — 82607 VITAMIN B-12: CPT

## 2023-02-10 PROCEDURE — 36415 COLL VENOUS BLD VENIPUNCTURE: CPT

## 2023-02-10 PROCEDURE — 84443 ASSAY THYROID STIM HORMONE: CPT

## 2023-02-10 PROCEDURE — 83036 HEMOGLOBIN GLYCOSYLATED A1C: CPT

## 2023-02-10 RX ORDER — CALCIUM CARBONATE 500(1250)
1 TABLET ORAL 2 TIMES DAILY
COMMUNITY

## 2023-02-10 RX ORDER — MULTIVITAMIN WITH IRON
1 TABLET ORAL DAILY
COMMUNITY

## 2023-02-10 RX ORDER — CHOLECALCIFEROL (VITAMIN D3) 50 MCG
1 TABLET ORAL DAILY
COMMUNITY
End: 2023-06-28

## 2023-02-10 RX ORDER — PHENTERMINE HYDROCHLORIDE 37.5 MG/1
TABLET ORAL
Qty: 45 TABLET | Refills: 0 | Status: SHIPPED | OUTPATIENT
Start: 2023-02-10 | End: 2023-04-10

## 2023-02-10 NOTE — LETTER
"    2/10/2023         RE: Nirali Hughes  [address] [place] [address]        Dear Colleague,    Thank you for referring your patient, Nirali Hughes, to the [facility] [place]. Please see a copy of my visit note below.    50 minutes spent on the date of the encounter doing chart review, history and exam, documentation and further activities per the note    New Bariatric Surgery Consultation Note    2023    RE: Nirali Remy  MR#: [id number]  : 1990      Referring provider: No flowsheet data found.    Chief Complaint/Reason for visit: evaluation for possible weight loss surgery    Dear No Ref-Primary, Physician (General),    I had the pleasure of seeing your patient, Nirali Remy, to evaluate her obesity and consider her for possible weight loss surgery. As you know, Nirali Remy is 32 year old.  She has a height of 5' 7\", a weight of 298 lbs 0 oz, and calculated Body mass index is 46.67 kg/m ..  Today we discussed our Bariatric Surgery program to address their weight related health. She has has viewed our online seminar prior to this visit and on discussion today, she has decided to focus on non surgical weight loss during this stressful year following divorce and with limited self care time and consider the surgical program next year if non surgical means are unable to produce meaningful reduction in weight/treat her Class III morbid obesity.  She's had some gestational diabetes history in both of her pregnancies (3 and 3 yo kids) and reports A1c of 5.7% on last PCP driven check (not available for my review).     Her insurance doesn't affordably cover Wegovy ($1300/month) and so we discussed option for dual therapy with phentermine and low dose metformin.  Blood pressure and heart exam today were suitable for stimulant based trial and we'll follow up her progress/tolerance through this next year. Consideration for " bupropion/naltrexone would be option as well should she not respond well to phentermine/metformin therapy.       .    Assessment & Plan   Problem List Items Addressed This Visit        Digestive    Morbid obesity (H)    Relevant Medications    metFORMIN (GLUCOPHAGE) 500 MG tablet    phentermine (ADIPEX-P) 37.5 MG tablet   Other Visit Diagnoses     Class 3 severe obesity due to excess calories without serious comorbidity with body mass index (BMI) of 45.0 to 49.9 in adult (H)    -  Primary    Relevant Medications    metFORMIN (GLUCOPHAGE) 500 MG tablet    phentermine (ADIPEX-P) 37.5 MG tablet    Other Relevant Orders    Comprehensive metabolic panel    CBC with platelets    Hemoglobin A1c    25- OH-Vitamin D    Vitamin B12    TSH         Plan:  1. Welcome to the Bariatric Care clinic. We'll focus on a year of non surgical weight loss during this time when you have less social support and to make sure non surgical/medication supported options are exhausted before considering bariatric surgery.    2. Aiming for protein/vegetable rich meals through the day to given 20-25 grams of lean protein every 4.5-6 hours and hydrating well between meals to prevent thirst related snacking. Goal water intake of 64-75oz/day.   Calorie goal to start of 1450-1600kcal/day for now.  Tracking with [employer] or other similar tracking sharan can be very helpful for finding your sweet spot during weight loss and preventing reactive eating.    3. Check labs today, I'll message results in about a week.     4. Follow up with dietician.     5. Start medication support: your insurance doesn't affordably cover Wegovy on investigation today but if your policy changed, you can call your benefits line to see what your copay likely would be.  put Wegovy at $1300/month and would be unaffordable. Likely similar with Saxenda.      As such, we'll ramp up dual therapy with phentermine and low dose metformin.  Start half a tablet of phentermine  after breakfast (no later than lunch) and then half a tablet of metformin with supper for 2 weeks and then increase the metformin to one full tablet with supper.     Stop medications if pregnancy occurs or plans to try.     Stop phentermine on sick days.  Stop metformin if severe upset stomach/vomiting/diarrhea.     6. Continue to find some fitness fun/routine aiming to build up to 3 days of strength work and 90-150minutes weekly of elliptical work by the end of Spring.       HISTORY OF PRESENT ILLNESS:  Weight Loss History Reviewed with Patient 2023   How long have you been overweight? Since late teens through early 20's   What is the most that you have ever weighed? 300   What is the most weight you have lost? 35   I have tried the following methods to lose weight Watching portions or calories, Exercise   I have tried the following weight loss medications? (Check all that apply) None   Have you ever had weight loss surgery? No   Chart review shows weight of 238 lbs in 2016, BMI 38. Discharged from hospital after  that was complicated by gestational diabetes  In 2019 at 272 lbs. Has gained about 26 lbs over the last 3 years.  Has 4 and 3 year old kids.     CO-MORBIDITIES OF OBESITY INCLUDE:     2023   I have the following health issues associated with obesity: Pre-Diabetes   No recent labs in chart. Will get today.    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   Kids not great sleepers, divorce seems to trigger.    Plans to have sister live with her next year and we'll focus on non surgical weight loss this year to optimize opportunity for avoiding surgery when less support at home.    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 Yes   Who do you have in your support network that can be available to help you for the first 2 weeks after surgery? Yes   Who can you count on for support throughout your weight loss surgery journey? Mom and sister   Can you afford 3 meals a day?  Yes   Can you afford 50-60 dollars a month for vitamins? Yes       HABITS:     2/6/2023   How often do you drink alcohol? Monthly or less   If you do drink alcohol, how many drinks might you have in a day? (one drink = 5 oz. wine, 1 can/bottle of beer, 1 shot liquor) 1 or 2   Have you ever used any of the following nicotine products? No   Have you or are you currently using street drugs or prescription strength medication for which you do not have a prescription for? No   Do you have a history of chemical dependency (alcohol or drug abuse)? No         PSYCHOLOGICAL HISTORY:   Psychological History Reviewed With Patient 2/6/2023   Have you ever attempted suicide? Never.   Have you had thoughts of suicide in the past year? No   Have you ever been hospitalized for mental illness or a suicide attempt? Never.   Do you have a history of chronic pain? No   Have you ever been diagnosed with fibromyalgia? No   Are you currently seeing a therapist or counselor?  No   Are you currently seeing a psychiatrist? No       ROS:     2/6/2023   Skin:  None of the above   HEENT: Missing teeth   If you answered yes to missing teeth, please indicate how many: 1   Musculoskeletal: None of the  "above   Cardiovascular: None of the above   Pulmonary: None of the above   Gastrointestinal: None of the above   Genitourinary: None of the above   Hematological: None of the above   Neurological: None of the above   Female only: Irregular menstrual cycles       EATING BEHAVIORS:     2/6/2023   Have you or anyone else thought that you had an eating disorder? No   Do you currently binge eat (eat a large amount of food in a short time)? No   Are you an emotional eater? Yes   Do you get up to eat after falling asleep? No   had nexplanon removed.     Stressors: ex  coparents, she has full legal custody.     EXERCISE:     2/6/2023   How often do you exercise? Never   What keeps you from being more active?  I should be more active but I just have not gotten around to it     Hobbies include: gets therapy weekly, her self care is to meditate for 10-15 minutes. Not much working out much lately. Has full gym. Has elliptical (feet bother her on treadmill), can work out for 25-30 minutes. .      MEDICATIONS:  Current Outpatient Medications   Medication Sig Dispense Refill     calcium carbonate (OS-FABIOLA) 500 MG tablet Take 1 tablet by mouth 2 times daily       magnesium 250 MG tablet Take 1 tablet by mouth daily       metFORMIN (GLUCOPHAGE) 500 MG tablet Start half a tablet for 2 weeks, then increase to one full tablet with supper if tolerated. 90 tablet 0     Multiple Vitamin (MULTIVITAMIN ADULT PO)        phentermine (ADIPEX-P) 37.5 MG tablet Start half tablet daily after breakfast. 45 tablet 0     vitamin D3 (CHOLECALCIFEROL) 50 mcg (2000 units) tablet Take 1 tablet by mouth daily         ALLERGIES:  No Known Allergies        PHYSICAL EXAM:  Objective     /78   Ht 1.702 m (5' 7\")   Wt 135.2 kg (298 lb)   BMI 46.67 kg/m    /78   Ht 1.702 m (5' 7\")   Wt 135.2 kg (298 lb)   BMI 46.67 kg/m    Body mass index is 46.67 kg/m .  Physical Exam   GENERAL: Young Black female in NAD. Obese. Ambulatory and in good " spirits.   NECK: no adenopathy, no asymmetry, masses, or scars and thyroid normal to palpation  RESP: lungs clear to auscultation - no rales, rhonchi or wheezes  CV: regular rate and rhythm, normal S1 S2, no S3 or S4, no murmur, click or rub, no peripheral edema and peripheral pulses strong  ABDOMEN: soft, nontender, no hepatosplenomegaly, no masses and bowel sounds normal central adiposity with some pannus.   MS: no gross musculoskeletal defects noted, no edema      In summary, Nirali Remy has Class III obesity with a body mass index of Body mass index is 46.67 kg/m . kg/m2 and the comorbidities stated above. She completed an informational seminar and is a possible candidate for the surgery in the future but given high social upheaval and 2 kids under the age of 5 in her home with no previous attempts at medical supported weight loss, we'll focus this next year on non surgical tools and reconsider surgery next year if not having meaningful results. .       Rajeev Figueroa MD  2/9/2023  10:28 AM              Again, thank you for allowing me to participate in the care of your patient.        Sincerely,        Rajeev Figueroa MD

## 2023-02-10 NOTE — PATIENT INSTRUCTIONS
"Plan:  Welcome to the [facility] clinic. We'll focus on a year of non surgical weight loss during this time when you have less social support and to make sure non surgical/medication supported options are exhausted before considering bariatric surgery.    2. Aiming for protein/vegetable rich meals through the day to given 20-25 grams of lean protein every 4.5-6 hours and hydrating well between meals to prevent thirst related snacking. Goal water intake of 64-75oz/day.   Calorie goal to start of 1450-1600kcal/day for now.  Tracking with [facility] or other similar tracking sharan can be very helpful for finding your sweet spot during weight loss and preventing reactive eating.    3. Check labs today, I'll message results in about a week.     4. Follow up with dietician.     5. Start medication support: your insurance doesn't affordably cover Wegovy on investigation today but if your policy changed, you can call your benefits line to see what your copay likely would be.  put Wegovy at $1300/month and would be unaffordable. Likely similar with Saxenda.      As such, we'll ramp up dual therapy with phentermine and low dose metformin.  Start half a tablet of phentermine after breakfast (no later than lunch) and then half a tablet of metformin with supper for 2 weeks and then increase the metformin to one full tablet with supper.     Stop medications if pregnancy occurs or plans to try.     Stop phentermine on sick days.  Stop metformin if severe upset stomach/vomiting/diarrhea.     6. Continue to find some fitness fun/routine aiming to build up to 3 days of strength work and 90-150minutes weekly of elliptical work by the end of Spring.       What makes a person succeed with dramatic and sustained weight loss?    It's being at the right point in your life where you feel the need to lose the weight, not because anyone told you so but because of a voice inside of you that says, \"I am ready for this\".  You're now " "at a point where you may be feeling anxious, irritable and when you look in the mirror you do not recognize the person looking back.  Your healthy self is buried somewhere in that reflexion and you want to free it again.  This is the sort of motivation that leads to success, and it comes from you.    Because the only person that can lose that extra weight is you, I like my patients to focus on the mindset of being in Weight Loss Season.  This gives you permission to make the changes necessary to be consistent with the diet/activity and behavior changes that lead to successful, healthy weight loss.  Nearly any diet plan can work for weight loss, but keeping it healthy and nutrient based to prevent deficiencies/hair loss/fatigue or irritability is vital.  If you have a plan you want to try, we'll work with you to make sure no adjustments are needed to keep you healthy through your weight loss season and working with our Bariatric Dieticians you'll be given expert guidance to customize your diet plan to suit your particular needs. If you don't have your own ideas in mind, we are always happy to suggest well researched and validated plans that provide enough food to prevent hunger but still tap into your excess fat reserves and lose weight in a sustainable fashion.  There is great evidence that lean protein/healthy fat intake with good fiber intake while minimizing simple starches/carbs produces reliable/sustainable weight loss in most people. But some feel more connected to an intermittent fasting/fast mimicking or ketogenic diet.  These protocols can be hard for many to stick with and that's why we prefer the protein/healthy fat focused diets but if these alternative strategies appeal to you, we can work with you to optimize your knowledge and results with these tools.    Losing weight is a temporary commitment, but you need to be \"All In\" to have a good weight loss season.  To avoid frustration, you have to be " "willing to be on track 19 out of 20 days or even better than that. But, weight loss season is generally only 4-8 months in length. After that length of time, it can be hard to maintain a negative calorie balance and our brain, motivations and metabolism will usually bring you to a plateau that cannot be broken in this modern world where other commitments start to take priority. That's when we look to stabilize the weight loss you've achieved.  If you've reached your goal by that time, fantastic, and job well done.  If there is more to go, then after a few months of stabilization, we can usually attack that previous plateau and break into new territory.    Because of this time limitation, we want to really get to work right away and get into a sustainable routine ASAP.  One of the best predictors of how much weight you're going to lose throughout the season is how much you lose in the first 6 weeks, so prepare well and jump in with both feet.      Occasionally, people may feel like they cannot commit fully to the changes necessary and may want to change one thing at a time and \"get used to\" the idea of losing weight.  That is OK because that is where they are in their life, and they cannot fully commit for any number of reasons.  It's part of that internal motivation and they just haven't reached PRIORTY NUMBER ONE status yet. It's possible that what they need is more time to reach that point and I am always willing to work with people that want to \"dabble\", but understand, the amount of success obtained with half measures, is much less than half results. Behavior Change cannot occur until we prepare our minds, bodies and environment for what is to come, action!    As you go through your plan, look for things to keep your motivation rolling.  The most successful people have a goal or target/reward that they are working towards.  Having a reward that celebrates your new fitness, mobility and energy is the best sort " "because it will encourage you to do well with the weight maintenance phase and long term lifestyle changes that promotes keeping the weight off for the long term.  Usually, \"getting healthier\" or improving blood tests or losing weight so your clothes fit better is not as internally motivating as having a tangible reward.  A good weight loss season reward is one that isn't food based, is affordable, but something special:  Something you won't be getting/doing unless you achieve your goal.   It s important to keep to the rule of success:  in order to get the reward, your goal MUST be achieved. Write this reward down, where you can look at it daily and keep it in the front of your mind as you go through your weight loss season and it will help keep you on track.    Tools that help change behavior are vital for success. The most studied and most supported tool for weight loss is nothing more than writing down your food and weight every day.  Every Day.  Accurately and completely.  When you commit to weight loss season, this information tells you whether you're getting ENOUGH food to fuel your weight loss properly as well as teaches you the interaction between different foods you eat and how your body responds with weight loss.  You'll see that sometimes after a heavy workout you don't see the scale move until 2-3 days later.  How saltier meals (chili for example) may make you retain water for 4-5 days before you see the weight come off, you'll get used to the mini-plateaus that develop after a good 3-8 lb drop in weight as well as how you break through if you keep working the diet as you should.    Weight loss is not a linear process, there are mini ups and downs.  Learning how your body loses weight and getting comfortable with that is very rewarding. The act of writing words on paper also solidifies your will power and commitment to the season of weight loss and that by itself changes your brain chemistry/appetite, " motivation and prepares you for maximal success.     Behavior change is all about getting into a new routine.  The old habits and routine have to change because without changing the circumstances of how you gained your weight, it's unlikely you'll enjoy satisfying results. If you have snacking habits, like every time you walk through the kitchen you grab a little something, well, that habit has to change and be replaced by a new habit.  It can be something as simple as keeping a doodle pad on the counter that you make a few scribbles and then walk through the kitchen having not opened the cupboard, or starting with a glass of water and leaving the kitchen without anything else, or checking your food journal to see how many calories you have left for the day.  Boredom is the enemy as are the old habits. Break new ground and try to push those old habits into a deep hole.  There are apps/counseling options available that can help with some of the day to day urges/behaviors if you're struggling. One commercial product that does a good job is NoMailInBlack.  Unfortunately, there is a subscription fee.    Finally, exercise always helps.  While not mandatory to lose weight, every little bit helps and exercise has so many other benefits that to not work it into your plan is to miss out on all the mood, sleep, stress and general health benefits that come from making yourself a little short of breath and sweaty at least 3-4 days out of the week.  The metabolism and calorie burn benefits aside, almost every chronic ailment in medicine gets better with proper, aerobic exercise.  Allow yourself to start slow and let your body prepare itself to accept harder training 4-6 weeks down the road, but start now and commit to a plan.  Whether you have the means to hire a , join a gym or just walk out your front door or go down to your basement for a video workout, get into a exercise routine and  after 3 weeks of at least 3 times a week  "exercise you should be at a point where you can slowly start ramping up 10% each week to our goal of at least 150-300minutes weekly of aerobic exercise and at least twice weekly resistance training/strenghtening with weights/bands or body weight exercises.     I am a big fan of modifying the free training plan, \"Couch to 5k\", for almost all of my patients. Just type it into [employer] or look it up on your smart phone sharan store.  To modify the plan,  you can use the training plan for whatever aerobic activity you do (bike/treadmill/elliptical/rower/pool/etc). During the \"jog\" intervals, you just move a little faster or harder or increase the tension or incline.  You use those little intervals to switch up the workout and recruit more muscles and pump the blood a little more and then recover again in the \"walk\" intervals by slowing down, decreasing the incline or turning down the tension.  3-4 days a week is not that much to ask and the benefits are enormous.  Start slow and develop the base from which you can then build on and reduce the risk of injury.  It's much more important 2-4 months from now to be enjoying your exercise then it is to over exert yourself at the start and hurt yourself.  Starting slowly allows your body to accept the training better down the road when the exercise becomes crucial for weight maintenance.  Without exercise down the road during your maintenance phase, all this hard work you are about to put in can be undone. It usually takes about 100-300 calories a day of exercise to maintain a weight loss and our focus during weight loss season is to generate the routine/activities and hobbies that make that enjoyable/sustainable.    Thanks for taking this first and most important step in your weight loss season.  Commit to it and we will cheerlead you all the way to success.  When things get tough or off track we'll offer guidance and analysis and when you reach your goal we'll celebrate your " success.  In the end, it is all about your success, your health and what you do with it.      Rajeev Figueroa MD  [facility]  [phone]          LEAN PROTEIN SOURCES  Getting 20-30 grams of protein, 3 meals daily, is appropriate for most people, some need more but more than about 40 grams per meal is not useful.  General rule is drinking one ounce of water per gram of protein eaten over the course of the day:  70 grams of protein each day, drink 70 oz of water.  Protein Source Portion Calories Grams of Protein                           Nonfat, plain Greek yogurt    (10 grams sugar or less) 3/4 cup (6 oz)  12-17   Light Yogurt (10 grams sugar or less) 3/4 cup (6 oz)  6-8   Protein Shake 1 shake 110-180 15-30   Skim/1% Milk or lactose-free milk 1 cup ( 8 oz)  8   Plain or light, flavored soymilk 1 cup  7-8   Plain or light, hemp milk 1 cup 110 6   Fat Free or 1% Cottage Cheese 1/2 cup 90 15   Part skim ricotta cheese 1/2 cup 100 14   Part skim or reduced fat cheese slices 1 ounce 65-80 8     Mozzarella String Cheese 1 80 8   Canned tuna, chicken, crab or salmon  (canned in water)  1/2 cup 100 15-20   White fish (broiled, grilled, baked) 3 ounces 100 21   [place]/Tuna (broiled, grilled, baked) 3 ounces 150-180 21   Shrimp, Scallops, Lobster, Crab 3 ounces 100 21   Pork loin, Pork Tenderloin 3 ounces 150 21   Boneless, skinless chicken /turkey breast                          (broiled, grilled, baked) 3 ounces 120 21   [place], [place], [place], and Venison 3 ounces 120 21   Lean cuts of red meat and pork (sirloin,   round, tenderloin, flank, ground 93%-96%) 3 ounces 170 21   Lean or Extra Lean Ground Turkey 1/2 cup 150 20   90-95% Lean [place] Burger 1 cinda 140-180 21   Low-fat casserole with lean meat 3/4 cup 200 17   Luncheon Meats                                                        (turkey, lean ham, roast beef, chicken) 3 ounces 100 21   Egg (boiled, poached,  scrambled) 1 Egg 60 7   Egg Substitute 1/2 cup 70 10   Nuts (limit to 1 serving per day)  3 Tbsp. 150 7   Nut [place] (peanut, almond)  Limit to 1 serving or less daily 1 Tbsp. 90 4   Soy Burger (varies) 1  15   Garbanzo, Black, [place] Beans 1/2 cup 110 7   Refried Beans 1/2 cup 100 7   Kidney and Lima beans 1/2 cup 110 7   Tempeh 3 oz 175 18   Vegan crumbles 1/2 cup 100 14   Tofu 1/2 cup 110 14   Chili (beans and extra lean beef or turkey) 1 cup 200 23   Lentil Stew/Soup 1 cup 150 12   Black Bean Soup 1 cup 175 12             Example Meal Plan for a 9600-7050 Calorie Diet:    In order to fuel your weight loss properly and avoid hunger-induced overeating later in the day, for your height and weight, you will enjoy the most success by following the diet below or similar with adjustments based on your particular tastes and preferences.  Exercise may influence speed, amount of weight loss further.     I recommend getting into a meal routine and keeping it similar day to day in the beginning so you don t have to think too hard about what you re going to make/eat.  Keep snacks healthy, ideally containing protein and some vegetables.  Non-processed food is preferable to packaged items.  Eat at least a few crunchy green vegetables if having a snack, which should be 2-3 hours after your mealtimes(prepare these ahead of time for ease of use).  Drink 64 oz -80 oz of water daily for most, some of you will need more and we'll discuss it at your visit if that is the case.      When changing our diet,  we can often mistake thirst for hunger or just have some distracted eating habits that we need to break free from ('bored/mindless eating', screen time,work, driving,etc).  A glass of water and reconsideration of our hunger is often all that is needed.  Having the urge is not the problem, but watching it pass by without acting on it is the goal.    If you re having hunger problems, add a protein drink/snack to your morning  hours or afternoon snack with at least 20grams of protein and not too much sugar (under 10g).  A carton of higher protein/low sugar yogurt can work as well.  If the urge to snack is overwhelming and not satiated, try going for a 10 minute walk/exercise, come home and drink a glass of water and if still hungry, have a  calorie snack (handful of raw/sprouted nuts, veggies and string cheese, protein bar, etc).  Savor it.    It is better to have a large breakfast, a moderate lunch and a smaller dinner to fuel your day.  People lose 10-15% more weight during their weight loss season with this strategy. Optimizing your protein intake at each meal will further keep you more satisfied while eating less food overall.  Getting exercise in early has also been shown to offer the best results (before breakfast ideally but anytime is the right time to exercise if that is not an option for you).    To make sure you re getting adequate vitamins and minerals during weight loss, I recommend one complete multivitamin a day of your choice.  Consider a probiotic and taking some vitamin D 2000 IU daily.    Let supper be your last meal of the day and ideally try to have at least 12 hours between supper and breakfast the next day to tap into some beneficial overnight fasting dynamics.  Midnight snacks need to go away. Water in the evening is fine, unsweetened, non caffeinated herbal tea is helpful as well.  Consolidating your meals within a 8-12 hour period of your day will help tap into these additional metabolic benefits and tends to keep your appetite up for breakfast, further helping to stay on track.  For most of my patients, I don't recommend an intermittent fasting style diet (many find it hard to fit in their lifestyle) but an overnight fast is very doable for most patients and helps regulate our hunger drives a little better.  This makes it very important to nail good intake at all three meals to feel satisfied/energized and  still lose weight.      If evening snacking desires are high, consider a glass of fiber supplement for some additional fullness (metamucil or similar). Most of us don't get the 25-30 grams per day of fiber that promotes good gut health/satiety.  Benefiber, metamucil, citrucel are reasonable/affordable options for most people.  Inulin, chicory, psyllium husk are reasonable options but start slow and low in the dose to avoid gas/bloating until your gut gets acclimated (ramping up to 5-10 grams per day of supplemental fiber after 3-4 weeks if needed).      Example Meal Plan:  Breakfast: 450-475 Calories  1 egg cooked on low in olive oil:   calories.  5oz Greek Yogurt (Fage plain classic: ~150 erick)  Handful of Berries of your choice (about a calorie per berry or 20-40cal per handful)    cup(cooked) of  old fashioned oatmeal or 1/2 cup(cooked) steel cut oats. (150 erick)  Sprinkle amount of brown sugar and a pat of butter. (40 erick)  Glass of  Water  Black coffee or unsweetened Tea (0calories).      2-3 hours Later Snack: (195 calories).  Glass of water  One string Cheese (80 calories) or 4 oz creamed cottage cheese (115 calories) with  Crunchy Celery sticks (less than 10 calories per large stalk) 2 stalks. (20 calories)    of a  Large Banana or   of a Large Apple (60 calories):  eat second half at lunch or afternoon snack.     Lunch:300 -350 calories   Chicken Breast  (baked/broiled/roasted/grilled)  4-6 oz.  (125-180 erick), BBQ sauce/hot sauce/mustard/seasoning is free. Just use a reasonable amount. Or a can of tuna with 1 tablespoon mayonnaise.  Salad: lettuce, any other veggies (cucumbers, green peppers/celery you like and a small drizzle of dressing to just flavor.  Go as big on the veggies as you like,  as they are practically calorie free.   A whole, 8 inch cucumber is 45 calories, a whole green pepper is 23 calories, a stalk of celery is 9 calories.  [place] Dressing is 60 calories per tablespoon..so  moderate your desired dressing or do a drizzle of olive oil and splash of balsamic vinegar on top,  Total calories unlikely to be over 150 even with dressing.  Glass of Water.    Option for lunch is meal replacement protein drink/smoothie.  Need at least 20 grams of protein and eat the rest of your apple/banana from the morning snack.      Afternoon Snack: 150-200 calories   Cheese Stick or cottage cheese again  and a fresh fruit OR  Granola Bar (protein Bar acceptable if under 200 calories OR  Homemade smoothies:  8oz skim milk,  a handful of berries (fresh or frozen and a serving of protein powder such as BiPro or Chela sWhey for example.  If you don't like dairy, make with 8oz water, one small banana, handful of berries and the protein powder, add any veggies you want as well:  roughly 200 calories.   Glass of Water    Dinner: 325 calories  4oz of fresh, [place] salmon.  Broiled (salt/pepper/dill) for about 8-8.5 minutes (200calories) or  4oz filet mignon steak or sirloin steak  Salad or vegetable sautéed lightly in olive oil or   Broccoli 1.5  cups chopped and steamed  or micro-waved in a little water (75 calories)  Glass of Water,    Cup of herbal tea (unsweetened, caffeine free)      Herbs and seasonings are encouraged to flavor your foods/vegetables.  Make your food delicious.      Tips for Success:  1.  Prepare proteins ahead of time (broil chicken breasts in bulk so you can grab and go), steel cut oats/lentils can be stored in casserole dish/bowl in the fridge for quick scoop in the morning and rewarm in microwave, make use of crock pot recipes (watch salt content).  Making meals that cover 3-4 future meals is an easy way to stay on track.  2.  Drink a 8-12 oz glass of water every 2-3 hours when awake.  We often mistake hunger for thirst, especially when losing weight.  3. Remember your Reward and Motivation when things get hard.  4.  Weigh yourself every morning and record, you'll stay on track better and  "learn how our biorhythms, diet and elimination patterns show up on the scale. Don't worry about 1 or 2 day patterns, but when on track you'll notice good trend downward of weight over 3-4 day segments.  Plateaus tend to resolve after 4-8 days in most cases if you stay consistent with your plan.  These are natural and part of weight loss, even if you're perfect with your plan execution.  5. Call if problems/concerns.  [employer] is a great tool to stay in touch and provide weekly outside accountability. Check in with questions or if you want to brag.  6.  Find a handful of meals/foods that keep you on track and feeling good and get into a routine that is sustainable for you.  It's OK to have a routine that works for you.  7.  Consider taking a complete multivitamin just to make sure all micronutrients are adequate during weight loss.  8. If losing hair/brittle nails it usually means you are not taking enough protein.  Minimum goal is 60 grams daily of protein for smaller women, 80 grams a day for men. Consider taking Biotin as supplement or a \"Hair and Nail\" multivitamin.        On-the-Go Breakfast Ideas  As of 2015, the latest research shows what a huge impact eating breakfast has on losing weight and feeling your best. People lose more weight when they make breakfast their biggest meal of the day compared to Dinner, but even if you cannot go to that degree, getting a breakfast that has at least 20 grams of protein and even a moderate amount of fat is ideal for maintaining good energy through the day and limits overeating in the evening hours.  The following are some quick and easy suggestions for at least getting something of substance into your body in the morning.  Enjoy!    Eating breakfast within 90 minutes of waking up is an important part of taking care of your body on a restricted calorie diet plan.  After sleeping for hours, your body is in need of fuel.  An ideal breakfast is a combination of protein, " whole-grain carbohydrates, or fruit.  Here s why:    -Protein digests very slowly in the body, helping you feel more satisfied.  -Whole grains provide dietary fiber, which also digests slowly and helps keep your gut clean.  -Fruit is a great source of vitamins, minerals, and fiber.     Each one of these breakfast combinations has between 200-300 calories and 15-20 grams of protein.  Feel free to mix and match!    Bone Broth (chicken bone broth or beef bone broth) is a great way to boost protein content. 8oz of bone broth will typically have 9-12grams of protein for 40kcal of energy.    Protein: Choose  -1/2 cup low-fat cottage cheese  -2 hard boiled eggs , or one cooked in olive oil (low/slow heat).  -1 low fat string cheese stick  -1 Tablespoon natural peanut butter  -Signaturit vegetarian sausage cinda (found in freezer section)  -1 slice lowfat cheese  -6 oz 2% or lowfat Greek yogurt, such as Fage or Oikos.    PLUS    Whole Grains:  Choose   -1 whole wheat English muffin  -1 whole wheat missy, half  -1/2 Fiber One frozen muffin, thawed  -1/2 Fiber One toaster pastry  -1 whole wheat bagel thin  -1/2 cup Kashi cereal  -1 Kashi waffle (or other whole grain high-fiber waffle)  Aim for whole grain/sprouted breads with at least 3g of fiber/slice if having bread. [place] is one such brand.    OR    Fruit: Choose  -1/3 cup blueberries  -1/2 banana (or a plantain- similar to a banana, yet smaller)  -1/2 cup cantaloupe cubes  -1 small apple  -1 small orange  -1/2 cup strawberries  -handful raspberries/blackberries (each berry is about 1 calorie).    *Adapted from Diabetes Living, Fall 20    Ten Breakfasts Under 250 calories    Ideally, getting between 350-600 calories  (depending on starting height and weight)for breakfast is ideal for avoiding hunger later in the day, adjust/add to the following accordingly:    One- 250 calories, 8.5 g protein  1 slice whole-grain toast   1 Tbsp peanut butter    banana    Two-  250 calories, 8 g protein    cup nonfat/lowfat yogurt  1/3rd cup diced no-sugar peaches  1/3rd cup cereal (like Special K, Cheerios, or bran flakes)    Three- 250 calories, 25 g protein  1 egg scrambled with 1 oz skim milk    cup shredded cheddar    whole grain English muffin  1 oz [place] nelson  1 tsp margarine spread    Four- 225 calories, 25 g protein  1/2 cup Kashi Go-Lean cereal    cup skim milk mixed with 1 scoop Bariatric Advantage protein powder    cup no-sugar diced pears    Five- 250 calories, 20 g protein    cup oatmeal prepared with skim milk, 1 scoop protein powder, and sugar-free maple syrup    Six- 200 calories, 5 g protein  1 whole grain waffle, toasted  1 tablespoon creamy peanut or almond butter    Seven-  250 calories, 19 g protein  Breakfast sandwich: 1 slice whole grain toast, cut in half.  Add 1 scrambled egg and one slice cheddar  cheese.    Eight-  250 calories, 15 g protein  2 eggs scrambled with 1/3 cup frozen spinach (heat before adding to eggs) and 2 tablespoons low fat cream cheese.    Nine-  150 calories, 15 g protein  2/3rd cup cottage cheese    cup cantaloupe    Ten- 200 calories, 20 g protein  Fruit smoothie made with 4 oz. nonfat Greek yogurt,   cup berries, 1 scoop protein powder, and 4 oz skim milk.    Ten Lunches Under 250 Calories    Aim for lunch to be around 300-400 calories a day when trying to lose weight and get that protein in!    One- 200 calories, 11 g protein  1/3 cup tuna salad made with light gonzales on 1 slice whole grain bread  1 small peeled apple    Two- 250 calories, 16 g protein  1/3 cup lowfat cottage cheese    cup cooked green beans    small fruit cocktail (in natural juice)    Three- 200 calories, 11 g protein    grilled cheese sandwich on whole grain bread with lowfat cheese  2/3rd cup of tomato soup    Four- 250 calories, 22 g protein  Deli wrap: 1 oz sliced turkey, 1 oz sliced ham, 1 oz sliced chicken rolled up with 1 slice low-fat cheese  1 small  orange    Five- 250 calories, 28 g protein  2/3rd cup chili with 1 oz shredded cheese  4 saltine crackers    Six- 250 calories, 22 g protein  1 cup fresh spinach with 2 oz chicken, 1/3rd cup mandarin oranges, and 2 tablespoons sliced almonds with 1 tablespoon  vinaigrette dressing    Seven- 200 calories, 11 g protein  1 Tbsp sugar-free preserves and 1 Tbsp peanut butter on 1 slice whole grain toast    cup nonfat/lowfat Greek yogurt    Eight- 250 calories, 18 g protein  1 small soft-shell chicken taco with 1 oz shredded cheese, lettuce, tomato, salsa, and 1 Tbsp light sour cream    cup black beans    Nine- 225 calories, 13 g protein  2 ounces baked chicken  1/4 cup mashed potatoes    cup green beans    Ten- 200 calories, 21 g protein  Deli missy: 2 oz roast beef or other deli meat with 1 tsp Faisal mayonnaise and sliced tomato, onion, and lettuce  1/3rd cup cottage cheese      Ten Dinners Under 300 calories    If you're eating a large breakfast and medium lunch, keep dinner small.  300-400 calories is ideal for most people depending on their caloric needs.    One- 300 calories, 12 g protein  1-inch thick slice of turkey meatloaf    cup baked butternut squash    Two- 200 calories, 9 g protein  Bread-less BLT: 3 slices turkey nelson, sliced tomato, wrapped in a large lettuce leaf    cup peeled fruit    Three- 275 calories, 36 g protein  3 oz roasted chicken    cup cooked broccoli    cup shredded cheddar cheese    cup unsweetened applesauce    Four- 200 calories, 25 g protein  3 oz baked tilapia  1/3rd cup cooked carrots    cup yogurt    Five- 250 calories, 20 g protein  Grilled ham  n  Swiss: spread 2 tsp ghee or butter on 1 slice of whole grain bread.  Cut bread in half, layer 2 oz deli ham with 1 piece of Swiss cheese and grill until cheese is melted.    cup cooked vegetables    Six- 250 calories, 18 g protein  Vegetarian cheeseburger: 1 [place]oca cheeseburger topped with lettuce, onion, tomato, and ketchup/mustard    cup  sweet potato fries    Seven- 250 calories, 18 g protein  Pork pot roast: 2 oz roasted pork loin, 1/3rd cup roasted carrots,   medium potato, cooked with   cup gravy    Eight- 330 calories, 25 g protein  2 oz meatballs (about 2 small meatballs)    cup spaghetti sauce  1/2 piece toast topped with 1 tsp ghee or butterand topped with garlic powder, toasted in oven    Nine- 250 calories, 16 g protein  Mexican pizza: one 8  corn tortilla topped with 2 oz chicken,   cup salsa, 2 tablespoons black beans, 2 tablespoons shredded cheese.  Bake until cheese is melted.    Ten- 250 calories, 22 g protein  Shrimp stir-mckeon: 3 oz cooked shrimp, 1/6th onion,   pepper,   cup chopped carrots sautéed in 1 tablespoon olive oil, topped with 2 tablespoons stir mckeon sauce and a pinch of sesame seeds        150 Calories or Less Snack Ideas   1 hardboiled egg with   cup berries  1 small apple with 1 hardboiled egg  10 almonds with   cup berries  2 clementines with 1 light string cheese  1 light string cheese with   sliced apple  1 light string cheese wrapped in 2 slices of turkey  4 100% whole wheat crackers (e.g. Triscuit) with 1 light string cheese    c. cottage cheese with   cup fruit and 1 Tbsp sunflower seeds     cup cottage cheese with   of an avocado     can tuna fish with 1 cup sliced cucumbers     cup roasted garbanzo beans with paprika and cayenne pepper    baked sweet potato with   cup chili beans or   cup cottage cheese  2 oz. nitrate free turkey slices with 1 cup carrots  1 container (6 oz) of low sugar (less than 10 grams of sugar) greek yogurt   3 Tablespoons of hummus with 1 cup sliced bell peppers   2 Tablespoons of hummus with 15 baby carrots  4 Tablespoons ranch dip made with plain Greek Yogurt and 3 mini cucumbers  1/4 cup nuts (any kind)  1 Tablespoon peanut butter with 1 stalk celery   1 dill pickle wrapped in 1-2 slices of deli ham with 1 tsp of mayonnaise/mustard.        MEDICATIONS FOR WEIGHT LOSS  There are several  medications available to assist us in weight loss.  By themselves, without a mindful change in diet and increase in movement/activity these medications are disappointing in their results. However, combined with a closely monitored program of diet change and exercise they can be very effective in controlling appetite and boosting initial weight loss.  All weight loss medications need continual re-evaluation for efficacy as their side effects and health benefits fail to be worthwhile if a person is not continuing to lose weight or in maintaining their healthy weight.  Some weight loss medications are scheduled drugs, meaning there is at least a theoretical possibility for developing addiction to them, but in practice this is rare.  We do anticipate coming off meds in the future- after stabilization of weight loss is assurred.  Finally, a tolerance can develop and people s perceived efficacy of medication can diminish.  In communication with your physician, it may be appropriate to intermittently take a break from these medications and then restart again (few weeks off then restart again) if a plateau is reached that cannot be broken through.  Each person can respond to a medication differently and to be a good option for you, it will need to be affordable, effective and well tolerated with minimal side effects.    In most cases, weight loss progress after one month and three months will be obtained and if a patient is not reaching the satisfactory progress towards weight loss, the medications may be discontinued.  The thought is that if a person is taking a weight loss medication and not receiving the potential health benefit of that drug, the side effects are not worthwhile and use should be discontinued.  On the flip side, there are many people on some weight loss medications for years because it continues to be an effective tool in their weight management and they are tolerating the medication without any long-term  side effects.  Each person's response and purpose will be evaluated.      PHENTERMINE (Adipex): approved in 1959 for appetite suppression.  It has stimulant effects and cannot be used with Ritalin, Concerta, or other stimulants.  Although it is not highly addictive, it's chemically related to amphetamines which are addictive and is classified as a Controlled Substance by the [place].  Occasional dependence can develop, but rarely. The most common side effects are dry mouth, increased energy and concentration, increased pulse, and constipation.  You should not take phentermine if you have glaucoma, hyperthyroidism, or uncontrolled/untreated hypertension or overly anxious. You should stop if dramatic mood swings, severe insomnia, palpations, chest pains, visual changes or if your Blood Pressure is consistently elevated or any time it's over 160/90.   It's ok to go off the med for a few weeks and restart if efficacy is wearing off.  $24-$30 for 90 tablets at [facility]. Females are required to have reliable birth control to reduce the risk birth defects/miscarriage.      TOPIRAMATE (Topamax): Anti-seizure medication, also used to prevent migraines and sometimes for mood stabilization.  Side effects include paresthesia, glaucoma, altered concentration, attention difficulties, memory and speech problems, metabolic acidosis, depression, increase in body temperature and decrease sweating, risk of kidney stones.  Do not take Topamax while taking Depakote as this can cause high ammonia levels.  You must have reliable birth control as Topamax can cause birth defects.  If prolonged use has occurred it should be tapered off slowly to avoid withdrawal issues.  Insurance usually covers Topiramate.  At higher doses, there may be some confusion/forgetfulness associated with this so we try to limit dose to under 75mg twice daily to reduce this risk. Often covered by insurance as it's used for many reasons.  Topamax will cause  carbonated beverages to taste bad. A recheck of your kidney/electrolytes may occur within a few months of starting.    QSYMIA (Phentermine + Topamax):  See above information about phentermine and Topamax.  Most common side effects are paresthesia, dizziness, distortion of taste, insomnia, constipation, and dry mouth.  See above descriptions for the two individual agents.Females are required to have reliable birth control to reduce the risk birth defects/miscarriage.  $150-$220 per month      GLP1 Agonists:  Liraglutide (Victoza/Saxenda), Semaglutide (Ozempic/Wegovy):   Part of the family of Glucagon Like Peptide Agonists, these medications directly suppresses appetite and are often used by diabetic patients due to improvements in glucose/insulin balance.  They also slow how quickly the stomach empties, increasing fullness. They may be hard to get covered for non diabetics and some plans have exclusions for weight loss purposes.  Currently, these are  injectable medications delivered via autoinjector pen. It can be very costly without insurance coverage (over $500/month).  Small risks for pancreatitis exists and dose should be held if increased mid abdominal pain/burning. It is not to be used if previous Multiple Endocrine Neoplasia. In rodents, may increase risk of thyroid tumors and not indicated for anyone with a history of medullary thyroid cancer as a result.  If changes in voice/swallowing should be discontinued. Reliable birth control required in women. [email or web address], [email or web address] has more information on these medications.    Contrave (Bupropion/Naltrexone).    Synergistic combination of a mild appetite suppressing anti-depressant (Bupropion) whose effects are increased due to interaction with Naltrexone.  Naltrexone may have some effects on craving and is often used in addiction medicine to help previous opiate addicts be less prone to relapse as it blocks the action of opiates. Should be stopped if any need for  "opiate pain medication, surgery or planned procedures where you'll be given sedation/anesthesia. If prolonged use, recommend stepping down bupropion over 2-3 weeks to limit any risk of withdrawal issues. Side effects may include dry mouth, increased heart rate, mild elevation in Blood pressure;  dizziness, ringing in the ears, anxiety (typically due to bupropion), nausea, constipation, and some get fatigued with naltrexone.  About $210 on [facility] for 120 tabs of \"Contrave\", the brand name without insurance coverage. Generic Bupropion 75mg: $25 for 120 tabs, Naltrexone: $55 for 90 tabs without insurance coverage on [employer]. Cannot be used if pregnant/trying to conceive or breast feeding.      Plenity:   By mail order pharmacy only, moderately expensive. $98/month.  2-3 capsules taken with 16 oz of water, 20 minutes before 2 meals daily provides crystals that expand into a gel that fills the stomach 20-25% and provides a mechanical fullness.  The Plenity then mixes with your next meal and increases satisfaction by bulking the meal up to feel bigger than it truly is. Plenity is not absorbed and gets passed through the digestive tract and excreted in stools. May cause some bloating/gas/full feeling as it behaves like a fiber in many ways. Cost not available yet. FDA cleared in March of 2019 and became available near the end of 2020 through mail order pharmacy only ([employer]). The safety and efficacy trials were done under \"Gelesis\" name. Not appropriate for people with stomach or bowel motility issues as requires you to pass it through digestive tract. [email or web address] has more information.        Information about the Weight Loss Medication Phentermine    When combined with mindful eating and behavior changes, weight loss medications can be a nice additional tool to maximize your weight loss season.  There are no magic pills and without diet and behavior changes, weight loss will be minimal.  Think of this medication " "as a tool to make your diet and behavior changes easier and you'll enjoy a higher probability of success.  Remember not to skip meals, but use this medication to tolerate your reduced calories more easily.  If you are very hungry in the evenings, you are likely not eating enough in the first 10 hours of your day and need to focus on getting your protein requirements in at each of your 3 daily meals.      Phentermine is a stimulant medication related to the amphetamine class of medication but with a lower risk of dependence and addiction.  It is used for weight loss by suppressing the appetite region of the brain.  It also may speed up the metabolic rate and give a person more energy.  Like any medication there are potential side effects and the most common are:  Dry mouth occurs in almost everyone (hydrate well), fewer people experience Palpatations, fast heart rate, elevation of blood pressure, restlessness, insomnia, dizziness, change in mood, tremor, headache, changes in bowel movements,itchiness, changes in sex drive.  If you are or may have become pregnant, do not use phentermine as it increases the risk for birth defects/miscarriage.  Do not use if breastfeeding.    Some people can develop serious side effects which include:  Heart strain (\"ischemia\").  Tachycardia (fast heart rate or irregular heart rate).  Hypertension  Pulmonary Hypertension  Psychosis  Dependency and abuse has occurred in some.  If you've been on high dose (37.5mg) for long periods, phentermine should be tapered down over a few weeks before abruptly stopping as seizures have been reported rarely.    We do not recommend taking it in combination with the following medications due to potential drug interactions which can increase the risk of side effects and/or potential for seizures:    Absolutely contraindicated are:  Amphetamines or other stimulants like ADHD medication: (dextroamphetamine, amphetamine, diethylpropion, isocarboxazid, " methamphetamine, lisdexamfetamine, benzphetamine,dexmethylphenidate, methylphenidate, selegiline patch, sibutramine, tranylcypromine.    Avoid use with:   Dopamine, dobutamine, ephedra, ephedrine, epinephrine, isoproterenol, linezolid, norepinephrine, phenylephrine injection, venlafaxine (Effexor).    Monitor or modify dose with:  Acebutolol, atenolol, betaxolol, bisoprolol, carvedilol, droxidopa, esmolol, labetalol, magnesium citrate, metoprolol, nadolol, nebivolol, penbutolol, pindolol, propranolol, sotalol, timolol.    Caution with: armodafinil,betaxolol eye drops, brexpiprazole, bupropion, busulfan, caffeine, carteolol drops, enzalutaminde, ginseng, green tea, guarana, levobunolol drops, lindane cream, modafinil, afrin nasal spray (oxymetazoline), pamabrom, phenylephrine oral and nasal spray, pseudoephedrine (sudafed), rasgiline, sleegiline, bowel prep, tiagabine, timolol drops,  TRAMADOL due to increased risk of seizures.    The current cheapest place to fill your prescription is at [facility], [facility] or [place] pharmacy,[facility] or [facility] and is around $22for 90 tablets.  Occasionally, [employer] and [employer] have price matched, so call around and get the best price for you.  Other pharmacies may charge closer to$70- $100 for the same prescription. You don't have to be a member to use the pharmacy at [facility] currently.  An alternative some patients have tried is using a voucher system through [employer].  $25 paid on their website gets you a  voucher that can allows you to pick your meds up at [facility] without paying anything more.  [employer] may also offer discounted coupons and give prices around you.    Dosing:  We start with half a tablet for the first 2-3 weeks and if tolerating it without problems, you can take up to one full tablet daily in the morning after breakfast.  For those with evening hunger problems, sometimes half a tablet in the morning and half a tablet around 1 pm can be  "effective, however, risks of nighttime insomnia/restless increase with afternoon dosing so call me at the clinic if considering this regimen or having any issues.  You only have to use the amount effective for you, not to exceed one full tablet.  It can also be used situationaly and does not have to be taken every day. For more sensitive individuals,: get a pill cutter and cut the half tab into quarters and use a quarter of a tablet, about 9mg, for a couple weeks before increasing to half a tablet (18.75mg) if needed.  As always, if any questions give us a call at the [facility] telephone:  [phone].     Don't use Phentermine if sick/ill or using other stimulants/cold medications and it's OK to skip days that you don't feel the need for appetite suppressant assistance.  This medication works the day you take it and doesn't require \"building up\" in the system.                      [facility]!      We are grateful that you have chosen us to partner with you on your journey to better health!  We have included some very important information for you to read as you begin your journey towards weight loss surgery. We will discuss parts of this as you move closer to surgery but please reach out if you have any questions or concerns.      Please click on the following links and they will lead you to a printable version of each handout or copy into your browser to view/print at home:    Making Your Decision, Understanding Weight Loss Surgery  [email or web address]    A Roadmap to Your Weight Loss Surgery  [email or web address]    Honoring Choices - Your Rights  [email or web address]    Honoring Choices - [place] Health Care Directive (form that can be filled out)  [email or web address]      Insurance Coverage and Approval for Surgery  Every insurance plan is different.  Many companies approve benefits for surgery, " but many have specific requirements that must be completed prior to being approved.    Verification of benefits is the patient's responsibility.  You will be responsible for any charges not covered by your insurance plan - including, but not limited to copays, deductible, out of pocket, any amount over your cap limit, etc.  Using the guideline below, please contact your insurance plan (we recommend you call the Customer Service number on the back of your card).      Once all of the requirements for surgery are complete, you will see the surgeon.  Following this visit, we will submit your information to your insurance plan for Prior Authorization.  We strongly encourage you to submit any documentation that supports your weight loss attempts to us.    Questions that are important to ask your insurance company when you call them:    Are [facility] in my network?  Is bariatric surgery a covered benefit under my policy?  If so, what is my estimated out of pocket expense?  Are there any exclusions or cap limits on my plan for bariatric surgery?  If so, what are they?  What are the criteria necessary to be approved for bariatric surgery?  Do you require medically supervised weight loss visits for approval of surgery?  If yes, for how many months?  Within the last # of years?  Are the following procedures a covered benefit under my plan?  Laparoscopic Gastric Bypass (CPT Code 65057)  Laparoscopic Sleeve Gastrectomy (CPT Code 04419)  Nutrition/Dietitian Consult (CPT Code 26353  Nutrition/Dietitian Reassessment (CPT Code 47326  Psychological Assessment (CPT Codes 26550 & 71450      Initial labs for Bariatric Surgery    Some lab orders were placed by your doctor in the [facility] system and can be drawn at any of our outpatient hospital labs or your clinic. If you do them at one of three [facility] ([facility] in [place], [facility] in [place], [facility] in [place]) you do not need an  appointment but if you'd like to do them at a clinic, you will need to schedule an appointment with that clinic.       You will need to be fasting 10-12 hours prior (you can continue to drink water) and should have them drawn sooner verses later so if any corrections need to be made we will have time to address them.      [facility] is open 7 am - 4:30 pm Monday through Friday and 9am-12:30 pm on Saturdays.  [facility] is open 7 am -4:30 pm Monday through Friday and 8am to 11:30am on Saturday and Sundays.     If you would like them done at a clinic outside the [employer] system, then we will need to know where to fax the orders.   If you have any questions or would like help scheduling a lab appointment at the [facility], please give us a call on the Bariatric Nurse Line at [phone].        [place] [facility]  Eat 3 nutrient-rich meals each day     Don't skip meals--it will cause you to overeat later in the day! Space meals 4-6 hours apart    Eat around the same times each day to develop a routine eating schedule    Avoid snacking unless physically hungry.   Planned snacks: 1-2 times per day and no more than 150 calories    Eating protein and fiber (vegetables/fruits/whole grains) with meals helps you stay full     Choose foods with less than 10 grams of sugar and 5-10 grams of fat per serving to prevent excess calories and weight gain  Eat protein first    Protein helps with healing, maintaining muscle mass and good nutrition, and reducing hunger     For RNY Gastric Bypass, Sleeve Gastrectomy, and Duodenal Switch: aim for 60-80 grams of protein per day    Eat protein first, then veggies and the fruits or grains  Stop drinking 15-30 minutes before meals and wait 30-45 minutes after eating to drink    Drinking too soon before a meal may cause you to be too full to eat    Drinking too soon after you eat will cause the food to  wash  through your new stomach too  quickly--leaving you hungry and likely to eat more    Eat [id number]    Take 20-30 minutes to eat each meal by taking small bites, chewing foods to applesauce consistency or 20-30 times before you swallow    Not chewing food properly can block the opening of your pouch--causing pain, nausea, vomiting    Eating foods too fast can delay those full signals and increase overeating   Slow down your eating by smaller plates/bowls, toddler utensils, putting your fork/spoon down between bites and not watching TV/computer during meals!  Make a list of activities to prevent boredom, stress and emotional eating    Go for a walk or lift weights while watching your favorite TV show    Talk to a co-worker or email/call a friend     Keep your hands and mind busy with woodworking, puzzles, knitting or painting your nails    Practice deep breathing or meditation  Drink 64 ounces of 0-Calorie drinks between meals     Water    Zero calorie Propel , Vitamin Water Zero  or [employer] , Crystal Light , Sugar-Free Trever-Aid , and other sugar-free lemonade or flavored majano    Decaffeinated, unsweetened coffee/ tea (1 cup or less per day)   Avoid Caffeine and Carbonation- NO STRAWS    Caffeine can irritate your new pouch, increase risk for ulcers, and can increase your appetite      Carbonation can lead to increased bloating/gas and discomfort   Work up to a total of 30-60 minutes of physical activity most days of the week    Helps with losing weight and preventing weight regain after surgery     Do a combination of cardio (walking/water exercises/biking/swimming) and strength training  Take daily vitamin/mineral supplements after surgery    Daily use of vitamins/minerals is necessary for the rest of your life      Psychological Evaluation for Bariatric Surgery      Why do I need a psychological evaluation before bariatric surgery?     The psychologist's main purpose is to provide the support and education to ensure you have the most  successful outcome after surgery.   Preparing for bariatric surgery often involves changing behavior patterns. Working on these changes before surgery allows you to achieve the best results after surgery as some of these behaviors have been entrenched for many years. In addition, your relationship with food may need to change. Psychologists are experts in behavior change and are there to guide and support you as you make these important changes.   A significant life change, like losing a lot of weight, may affect many areas of your life--self-concept, physical activity and relationships with others. Your psychologist will help you prepare to adjust to these changes in a healthy way.   If you have mental health symptoms, relationship struggles, or other challenges, your psychologist will suggest how to best address these concerns so that they do not interfere with your progress toward a healthier lifestyle.       Is the psychologist looking for reasons to say I can't have surgery?   The goal is to not find specific psychological problems. Instead, the psychologist will be working with your strengths to ensure you have the most optimal outcome after surgery.  There is no expectation that you will have everything figured out already or that you must have  perfect  behaviors before moving forward with surgery. Your psychologist is expecting that you will have some behavior changes that will need to occur concurrent with the surgery.   You can feel comfortable that the psychologist is not there to    you or your habits. The psychologist is there to provide support and encourage your progress.      What should I expect during the evaluation?   During the interview, which could take place over 2 or more sessions, the psychologist will ask about:   -weight history, current eating pattern and fluid intake, and previous attempts at weight loss   -activity level   -psychiatric history  -drug, alcohol and nicotine use    -social and developmental history  -support system   -current stressors and coping mechanisms   -understanding of the risks of surgery   -expectations for outcomes after surgery   You will complete various questionnaires that will focus on coping strategies, eating behaviors, quality of life and adverse childhood experiences in order to provide additional information to inform the assessment.   Your psychologist will likely give you some  homework assignments  to practice as you prepare for surgery. This will be individually tailored to your specific needs.   Your psychologist will talk with you about some of the typical challenges that patients might encounter after surgery and how to prepare for these.   A final report will be generated and any treatment recommendations will be discussed with you and the bariatric team to determine next steps.   If you would like, you may have any support people (e.g., spouse) join a session of the evaluation to provide additional information.       Bariatric surgery offers a physical  tool  for weight management. Similarly, your work with the psychologist will provide you with some additional emotional and behavioral  tools  as you work toward your healthier lifestyle goals.      Support Group    Support and accountability is an important part of your weight loss journey and maintenance once you reach your health goals.  Because of this, we require that you attend at least one of our support groups before you meet with the surgeon so you get a feel for what they are like and hopefully establish a connection to others who are going through a similar process or have had surgery before so you can ask your questions.    We currently have two options for support group but they are in the virtual format only at this time.  Both groups are using Microsoft Teams for their platform and you can access it through the web or an sharan that can be downloaded to a smart phone if you have one.       The Pre- and Post-op Connections Group is on the second Tuesday of the month from 6:30-8pm and is hosted by Aniket Briceno, PhD.  If you are interested in this group, you will need to email him at [email or web address] each month and then he will in turn send you the invitation to join.      We also have a Post-op focused Connections Group the 4th Wednesday of the month from 11am-12pm that is mostly geared toward post-operative patients who are past three months post-op.  This group is hosted by PAULA Frances, CBN, CIC and you can email her to join the group at [email or web address] each month and she will send you an invite similar to Aniket's group.  If you decide you would like to be a regular attender at this group, we can add you to an automatic invitation list of people.    You can see more information about available support groups that the [employer] offers to our patients as well by following the link:    The following Support Group information can also be found on our website:  [email or web address]      Please let us know if you have any questions about all the information above and we will be talking more about this during future visits.     Thank you,   [facility]

## 2023-02-13 LAB — DEPRECATED CALCIDIOL+CALCIFEROL SERPL-MC: 18 UG/L (ref 20–75)

## 2023-02-16 RX ORDER — ERGOCALCIFEROL 1.25 MG/1
50000 CAPSULE, LIQUID FILLED ORAL WEEKLY
Qty: 12 CAPSULE | Refills: 0 | Status: SHIPPED | OUTPATIENT
Start: 2023-02-16 | End: 2023-05-17

## 2023-04-10 DIAGNOSIS — E66.813 CLASS 3 SEVERE OBESITY DUE TO EXCESS CALORIES WITHOUT SERIOUS COMORBIDITY WITH BODY MASS INDEX (BMI) OF 45.0 TO 49.9 IN ADULT (H): ICD-10-CM

## 2023-04-10 DIAGNOSIS — E66.01 CLASS 3 SEVERE OBESITY DUE TO EXCESS CALORIES WITHOUT SERIOUS COMORBIDITY WITH BODY MASS INDEX (BMI) OF 45.0 TO 49.9 IN ADULT (H): ICD-10-CM

## 2023-04-10 RX ORDER — PHENTERMINE HYDROCHLORIDE 37.5 MG/1
TABLET ORAL
Qty: 45 TABLET | Refills: 0 | Status: SHIPPED | OUTPATIENT
Start: 2023-04-10 | End: 2023-06-28 | Stop reason: SINTOL

## 2023-04-10 NOTE — PROGRESS NOTES
Refill metformin/phentermine which patient reports being well tolerated/efficacious.  Rajeev Figueroa MD

## 2023-04-11 PROBLEM — O24.414 INSULIN CONTROLLED GESTATIONAL DIABETES MELLITUS (GDM) IN THIRD TRIMESTER: Status: ACTIVE | Noted: 2018-06-17

## 2023-04-11 PROBLEM — M99.04 SOMATIC DYSFUNCTION OF SACRAL REGION: Status: RESOLVED | Noted: 2019-09-11 | Resolved: 2023-04-11

## 2023-04-11 PROBLEM — M40.00 KYPHOSIS (ACQUIRED) (POSTURAL): Status: RESOLVED | Noted: 2019-09-11 | Resolved: 2023-04-11

## 2023-04-11 PROBLEM — D25.9 LEIOMYOMA OF UTERUS AFFECTING PREGNANCY: Status: ACTIVE | Noted: 2019-12-10

## 2023-04-11 PROBLEM — M53.3 SACRAL PAIN: Status: RESOLVED | Noted: 2019-09-11 | Resolved: 2023-04-11

## 2023-04-11 PROBLEM — M54.50 LUMBAGO: Status: RESOLVED | Noted: 2019-09-11 | Resolved: 2023-04-11

## 2023-04-11 PROBLEM — M99.03 SOMATIC DYSFUNCTION OF LUMBAR REGION: Status: RESOLVED | Noted: 2019-09-11 | Resolved: 2023-04-11

## 2023-04-11 PROBLEM — M99.02 THORACIC SEGMENT DYSFUNCTION: Status: RESOLVED | Noted: 2019-09-11 | Resolved: 2023-04-11

## 2023-04-11 PROBLEM — O34.10 LEIOMYOMA OF UTERUS AFFECTING PREGNANCY: Status: ACTIVE | Noted: 2019-12-10

## 2023-04-14 ENCOUNTER — OFFICE VISIT (OUTPATIENT)
Dept: SURGERY | Facility: CLINIC | Age: 33
End: 2023-04-14
Payer: COMMERCIAL

## 2023-04-14 VITALS
DIASTOLIC BLOOD PRESSURE: 80 MMHG | BODY MASS INDEX: 45.48 KG/M2 | SYSTOLIC BLOOD PRESSURE: 124 MMHG | HEIGHT: 67 IN | WEIGHT: 289.8 LBS

## 2023-04-14 DIAGNOSIS — R79.89 LOW VITAMIN D LEVEL: ICD-10-CM

## 2023-04-14 DIAGNOSIS — E66.813 CLASS 3 SEVERE OBESITY DUE TO EXCESS CALORIES WITHOUT SERIOUS COMORBIDITY WITH BODY MASS INDEX (BMI) OF 45.0 TO 49.9 IN ADULT (H): Primary | ICD-10-CM

## 2023-04-14 DIAGNOSIS — E66.01 CLASS 3 SEVERE OBESITY DUE TO EXCESS CALORIES WITHOUT SERIOUS COMORBIDITY WITH BODY MASS INDEX (BMI) OF 45.0 TO 49.9 IN ADULT (H): Primary | ICD-10-CM

## 2023-04-14 PROCEDURE — 99214 OFFICE O/P EST MOD 30 MIN: CPT | Performed by: EMERGENCY MEDICINE

## 2023-04-14 NOTE — PROGRESS NOTES
Bariatric Clinic Follow-Up Visit:    Nirali Hughes is a 32 year old  female with Body mass index is 45.39 kg/m .  presenting here today for follow-up on non-surgical efforts for weight loss. Original Intake visit occurred on 2/10/23 with some interest in our surgical program but ultimately decided to pursue non surgical weight loss this year due to recent divorce/stressors and limited self care time.  She started at that visit with a weight of 298 lbs and BMI of 46.7.  Along with diet and behavior changes, she has been using phentermine and metformin (her insurance this year doesn't affordably cover Wegovy) to assist her weight loss goals.  See her intake visit notes for details on identified contributors to weight gain in the past. Chart review shows Dietician visit didn't happen so this was rescheduled today for next week. Reviewed her estimated RMR/intake goals today.  2/10/23 labs showed low vitamin D at 18mcg/L, normal thyroid function, CBC, CMP and A1c of 5.4%. Weekly 1250mcg D2 boost was prescribed for 12 weeks to be followed by 50mcg/day of D3 for supplementation.    Weight:   Wt Readings from Last 5 Encounters:   04/14/23 131.5 kg (289 lb 12.8 oz)   02/10/23 135.2 kg (298 lb)   10/17/19 122.3 kg (269 lb 9.6 oz)   10/10/19 122 kg (269 lb)   09/18/19 121.1 kg (267 lb)    pounds      Comorbidities:  Patient Active Problem List   Diagnosis     Morbid obesity (H)     Normal delivery     Leiomyoma of uterus affecting pregnancy     Insulin controlled gestational diabetes mellitus (GDM) in third trimester       Current Outpatient Medications:      calcium carbonate (OS-FABIOLA) 500 MG tablet, Take 1 tablet by mouth 2 times daily, Disp: , Rfl:      magnesium 250 MG tablet, Take 1 tablet by mouth daily, Disp: , Rfl:      metFORMIN (GLUCOPHAGE) 500 MG tablet, Start half a tablet for 2 weeks, then increase to one full tablet with supper if tolerated., Disp: 90 tablet, Rfl: 0     Multiple Vitamin (MULTIVITAMIN ADULT  PO), , Disp: , Rfl:      phentermine (ADIPEX-P) 37.5 MG tablet, Start half tablet daily after breakfast., Disp: 45 tablet, Rfl: 0     vitamin D2 (ERGOCALCIFEROL) 62433 units (1250 mcg) capsule, Take 1 capsule (50,000 Units) by mouth once a week for 90 days, Disp: 12 capsule, Rfl: 0     vitamin D3 (CHOLECALCIFEROL) 50 mcg (2000 units) tablet, Take 1 tablet by mouth daily, Disp: , Rfl:       Interim: Since our last visit, she has noticed more cravings this past week, more in the 2-6pm. Lost about 6 lbs first 3 weeks,   First  meals are toast/spreadable creamed cheese. Lunch is a bit later in the afternoon around 2pm.     She's having some classic over restriction, increased craving behaviors so we discussed anchoring meals in protein richness through the day for better satiety and continued good weight loss.       Plan:   1.  Diet: aiming to get more nourishment in the first half of your day, being mindful about 20-30 grams of protein per meal every 5-6 hours for keeping the afternoon cravings under control. Consider grab and go protein shakes/bars if needed to round out your experience if regular foods/proteins are not easily available.  I recommend tracking intake the next 10 days to see how the 6345-9173 kcal/day range feels if getting 80 grams of lean protein daily and 80 oz of water daily.  2. Exercise: find ways to increase aerobic activity, anything longer than 10 continuous minutes is beneficial.   3. Medication: phentermine tolerated and can continue at half a tablet after breakfast but no later than lunch. Metformin can continue once daily with supper. Vitamin D2 can continue until done and then you can start 50mcg/day of vitamin D3 which is available over the counter, without a prescription  4. Follow up with dietician.  5. Goals: seeing the 1-2 lbs weekly on average should continue with the above regimen.       We discussed [facility]ariat[facility] Basics including:  -eating 3 meals daily  -reviewed metabolic  needs for weight loss based on Resting Metabolic Rate  -protein goals supportive of healthy weight loss  -avoiding/limiting calorie containing beverages  -We discussed the importance of restorative sleep and stress management in maintaining a healthy weight.  -We discussed the National Weight Control Registry healthy weight maintenance strategies and ways to optimize metabolism.  -We discussed the importance of physical activity including cardiovascular and strength training in maintaining a healthier weight and explored viable options.      Most recent labs:  Lab Results   Component Value Date    WBC 7.7 02/10/2023    HGB 14.7 02/10/2023    HCT 43.1 02/10/2023    MCV 86 02/10/2023     02/10/2023     No results found for: CHOL  No results found for: HDL  No components found for: LDLCALC  No results found for: TRIG  No results found for: CHOLHDL  Lab Results   Component Value Date    ALT 22 02/10/2023    AST 27 02/10/2023    ALKPHOS 71 02/10/2023     No results found for: HGBA1C  Lab Results   Component Value Date    B12 1,071 02/10/2023     No components found for: VITDT1  No results found for: ANTOLIN  No results found for: PTHI  No results found for: ZN  No results found for: VIB1WB  Lab Results   Component Value Date    TSH 1.03 02/10/2023     Vitamin D Deficiency Screening Results:  Lab Results   Component Value Date    VITDT 18 (L) 02/10/2023       No results found for: TEST    DIETARY HISTORY  Tracking technique: yes  Positive Changes Since Last Visit: trying to be more active  Struggling With: tendency for skipping breakfast previously has led to poor intake at start of weight loss season and afternoon snack urges.     Getting Adequate Protein: no  Sleep schedule: good.      PHYSICAL ACTIVITY PATTERNS:  Cardiovascular: walking and playing with kids (3 and 3 yo).   Strength Training: no    REVIEW OF SYSTEMS  Tolerates phentermine without mood swings/insomnia/palpitations or excessive dry mouth..  PHYSICAL  "EXAM:  Vitals: /80 (BP Location: Right arm)   Ht 1.702 m (5' 7\")   Wt 131.5 kg (289 lb 12.8 oz)   BMI 45.39 kg/m    Weight:   Wt Readings from Last 3 Encounters:   04/14/23 131.5 kg (289 lb 12.8 oz)   02/10/23 135.2 kg (298 lb)   10/17/19 122.3 kg (269 lb 9.6 oz)         GEN: Pleasant, well groomed, in no acute distress  HEENT:  Normal facies .  NECK: No swelling.  HEART: RRR without murmur.  LUNGS: No respiratory difficulty noted. No cough. .  ABDOMEN: nontender, obese.  EXTREMITIES: No tremor. Ambulation is indendent..  NEURO: Alert and Oriented X3, fluent speech. .  SKIN: No visible rashes. .    Interim study results: labs reviewed with her today as noted above..      30 minutes spent by me on the date of the encounter doing chart review, history and exam, documentation and further activities per the note   Rajeev Figueroa MD  [facility]  7:53 AM  4/14/2023  "

## 2023-04-14 NOTE — LETTER
4/14/2023         RE: Nirali Hughes  [address] [place] [address]        Dear Colleague,    Thank you for referring your patient, Nirali Hughes, to the [facility] [place]. Please see a copy of my visit note below.    Bariatric Clinic Follow-Up Visit:    Nirali Hughes is a 32 year old  female with Body mass index is 45.39 kg/m .  presenting here today for follow-up on non-surgical efforts for weight loss. Original Intake visit occurred on 2/10/23 with some interest in our surgical program but ultimately decided to pursue non surgical weight loss this year due to recent divorce/stressors and limited self care time.  She started at that visit with a weight of 298 lbs and BMI of 46.7.  Along with diet and behavior changes, she has been using phentermine and metformin (her insurance this year doesn't affordably cover Wegovy) to assist her weight loss goals.  See her intake visit notes for details on identified contributors to weight gain in the past. Chart review shows Dietician visit didn't happen so this was rescheduled today for next week. Reviewed her estimated RMR/intake goals today.  2/10/23 labs showed low vitamin D at 18mcg/L, normal thyroid function, CBC, CMP and A1c of 5.4%. Weekly 1250mcg D2 boost was prescribed for 12 weeks to be followed by 50mcg/day of D3 for supplementation.    Weight:   Wt Readings from Last 5 Encounters:   04/14/23 131.5 kg (289 lb 12.8 oz)   02/10/23 135.2 kg (298 lb)   10/17/19 122.3 kg (269 lb 9.6 oz)   10/10/19 122 kg (269 lb)   09/18/19 121.1 kg (267 lb)    pounds      Comorbidities:  Patient Active Problem List   Diagnosis     Morbid obesity (H)     Normal delivery     Leiomyoma of uterus affecting pregnancy     Insulin controlled gestational diabetes mellitus (GDM) in third trimester       Current Outpatient Medications:      calcium carbonate (OS-FABIOLA) 500 MG tablet, Take 1 tablet by mouth 2 times  daily, Disp: , Rfl:      magnesium 250 MG tablet, Take 1 tablet by mouth daily, Disp: , Rfl:      metFORMIN (GLUCOPHAGE) 500 MG tablet, Start half a tablet for 2 weeks, then increase to one full tablet with supper if tolerated., Disp: 90 tablet, Rfl: 0     Multiple Vitamin (MULTIVITAMIN ADULT PO), , Disp: , Rfl:      phentermine (ADIPEX-P) 37.5 MG tablet, Start half tablet daily after breakfast., Disp: 45 tablet, Rfl: 0     vitamin D2 (ERGOCALCIFEROL) 27418 units (1250 mcg) capsule, Take 1 capsule (50,000 Units) by mouth once a week for 90 days, Disp: 12 capsule, Rfl: 0     vitamin D3 (CHOLECALCIFEROL) 50 mcg (2000 units) tablet, Take 1 tablet by mouth daily, Disp: , Rfl:       Interim: Since our last visit, she has noticed more cravings this past week, more in the 2-6pm. Lost about 6 lbs first 3 weeks,   First  meals are toast/spreadable creamed cheese. Lunch is a bit later in the afternoon around 2pm.     She's having some classic over restriction, increased craving behaviors so we discussed anchoring meals in protein richness through the day for better satiety and continued good weight loss.       Plan:   1.  Diet: aiming to get more nourishment in the first half of your day, being mindful about 20-30 grams of protein per meal every 5-6 hours for keeping the afternoon cravings under control. Consider grab and go protein shakes/bars if needed to round out your experience if regular foods/proteins are not easily available.  I recommend tracking intake the next 10 days to see how the 9033-0689 kcal/day range feels if getting 80 grams of lean protein daily and 80 oz of water daily.  2. Exercise: find ways to increase aerobic activity, anything longer than 10 continuous minutes is beneficial.   3. Medication: phentermine tolerated and can continue at half a tablet after breakfast but no later than lunch. Metformin can continue once daily with supper. Vitamin D2 can continue until done and then you can start 50mcg/day  of vitamin D3 which is available over the counter, without a prescription  4. Follow up with dietician.  5. Goals: seeing the 1-2 lbs weekly on average should continue with the above regimen.       We discussed [facility] Bariatric Basics including:  -eating 3 meals daily  -reviewed metabolic needs for weight loss based on Resting Metabolic Rate  -protein goals supportive of healthy weight loss  -avoiding/limiting calorie containing beverages  -We discussed the importance of restorative sleep and stress management in maintaining a healthy weight.  -We discussed the National Weight Control Registry healthy weight maintenance strategies and ways to optimize metabolism.  -We discussed the importance of physical activity including cardiovascular and strength training in maintaining a healthier weight and explored viable options.      Most recent labs:  Lab Results   Component Value Date    WBC 7.7 02/10/2023    HGB 14.7 02/10/2023    HCT 43.1 02/10/2023    MCV 86 02/10/2023     02/10/2023     No results found for: CHOL  No results found for: HDL  No components found for: LDLCALC  No results found for: TRIG  No results found for: CHOLHDL  Lab Results   Component Value Date    ALT 22 02/10/2023    AST 27 02/10/2023    ALKPHOS 71 02/10/2023     No results found for: HGBA1C  Lab Results   Component Value Date    B12 1,071 02/10/2023     No components found for: VITDT1  No results found for: ANTOLIN  No results found for: PTHI  No results found for: ZN  No results found for: VIB1WB  Lab Results   Component Value Date    TSH 1.03 02/10/2023     Vitamin D Deficiency Screening Results:  Lab Results   Component Value Date    VITDT 18 (L) 02/10/2023       No results found for: TEST    DIETARY HISTORY  Tracking technique: yes  Positive Changes Since Last Visit: trying to be more active  Struggling With: tendency for skipping breakfast previously has led to poor intake at start of weight loss season and afternoon snack urges.  "    Getting Adequate Protein: no  Sleep schedule: good.      PHYSICAL ACTIVITY PATTERNS:  Cardiovascular: walking and playing with kids (3 and 3 yo).   Strength Training: no    REVIEW OF SYSTEMS  Tolerates phentermine without mood swings/insomnia/palpitations or excessive dry mouth..  PHYSICAL EXAM:  Vitals: /80 (BP Location: Right arm)   Ht 1.702 m (5' 7\")   Wt 131.5 kg (289 lb 12.8 oz)   BMI 45.39 kg/m    Weight:   Wt Readings from Last 3 Encounters:   04/14/23 131.5 kg (289 lb 12.8 oz)   02/10/23 135.2 kg (298 lb)   10/17/19 122.3 kg (269 lb 9.6 oz)         GEN: Pleasant, well groomed, in no acute distress  HEENT:  Normal facies .  NECK: No swelling.  HEART: RRR without murmur.  LUNGS: No respiratory difficulty noted. No cough. .  ABDOMEN: nontender, obese.  EXTREMITIES: No tremor. Ambulation is indendent..  NEURO: Alert and Oriented X3, fluent speech. .  SKIN: No visible rashes. .    Interim study results: labs reviewed with her today as noted above..      30 minutes spent by me on the date of the encounter doing chart review, history and exam, documentation and further activities per the note   Rajeev Figueroa MD  [facility]  7:53 AM  4/14/2023      Again, thank you for allowing me to participate in the care of your patient.        Sincerely,        Rajeev Figueroa MD    "

## 2023-04-14 NOTE — PATIENT INSTRUCTIONS
Plan:   1.  Diet: aiming to get more nourishment in the first half of your day, being mindful about 20-30 grams of protein per meal every 5-6 hours for keeping the afternoon cravings under control. Consider grab and go protein shakes/bars if needed to round out your experience if regular foods/proteins are not easily available.  I recommend tracking intake the next 10 days to see how the 3000-1537 kcal/day range feels if getting 80 grams of lean protein daily and 80 oz of water daily.  2. Exercise: find ways to increase aerobic activity, anything longer than 10 continuous minutes is beneficial.   3. Medication: phentermine tolerated and can continue at half a tablet after breakfast but no later than lunch. Metformin can continue once daily with supper. Vitamin D2 can continue until done and then you can start 50mcg/day of vitamin D3 which is available over the counter, without a prescription  4. Follow up with dietician.  5. Goals: seeing the 1-2 lbs weekly on average should continue with the above regimen.       LEAN PROTEIN SOURCES  Getting 20-30 grams of protein, 3 meals daily, is appropriate for most people, some need more but more than about 40 grams per meal is not useful.  General rule is drinking one ounce of water per gram of protein eaten over the course of the day:  70 grams of protein each day, drink 70 oz of water.  Protein Source Portion Calories Grams of Protein                           Nonfat, plain Greek yogurt    (10 grams sugar or less) 3/4 cup (6 oz)  12-17   Light Yogurt (10 grams sugar or less) 3/4 cup (6 oz)  6-8   Protein Shake 1 shake 110-180 15-30   Skim/1% Milk or lactose-free milk 1 cup ( 8 oz)  8   Plain or light, flavored soymilk 1 cup  7-8   Plain or light, hemp milk 1 cup 110 6   Fat Free or 1% Cottage Cheese 1/2 cup 90 15   Part skim ricotta cheese 1/2 cup 100 14   Part skim or reduced fat cheese slices 1 ounce 65-80 8     Mozzarella String Cheese 1 80 8    Canned tuna, chicken, crab or salmon  (canned in water)  1/2 cup 100 15-20   White fish (broiled, grilled, baked) 3 ounces 100 21   [place]/Tuna (broiled, grilled, baked) 3 ounces 150-180 21   Shrimp, Scallops, Lobster, Crab 3 ounces 100 21   Pork loin, Pork Tenderloin 3 ounces 150 21   Boneless, skinless chicken /turkey breast                          (broiled, grilled, baked) 3 ounces 120 21   [place], [place], [place], and Venison 3 ounces 120 21   Lean cuts of red meat and pork (sirloin,   round, tenderloin, flank, ground 93%-96%) 3 ounces 170 21   Lean or Extra Lean Ground Turkey 1/2 cup 150 20   90-95% Lean Martin Burger 1 cinda 140-180 21   Low-fat casserole with lean meat 3/4 cup 200 17   Luncheon Meats                                                        (turkey, lean ham, roast beef, chicken) 3 ounces 100 21   Egg (boiled, poached, scrambled) 1 Egg 60 7   Egg Substitute 1/2 cup 70 10   Nuts (limit to 1 serving per day)  3 Tbsp. 150 7   Nut [place] (peanut, almond)  Limit to 1 serving or less daily 1 Tbsp. 90 4   Soy Burger (varies) 1  15   Garbanzo, Black, [place] Beans 1/2 cup 110 7   Refried Beans 1/2 cup 100 7   Kidney and Lima beans 1/2 cup 110 7   Tempeh 3 oz 175 18   Vegan crumbles 1/2 cup 100 14   Tofu 1/2 cup 110 14   Chili (beans and extra lean beef or turkey) 1 cup 200 23   Lentil Stew/Soup 1 cup 150 12   Black Bean Soup 1 cup 175 12           Example Meal Plan for a 7326-0445 Calorie Diet:    In order to fuel your weight loss properly and avoid hunger-induced overeating later in the day, for your height and weight, you will enjoy the most success by following the diet below or similar with adjustments based on your particular tastes and preferences.  Exercise may influence speed, amount of weight loss further.     I recommend getting into a meal routine and keeping it similar day to day in the beginning so you don t have to think too hard about what you re going to make/eat.  Keep snacks  healthy, ideally containing protein and some vegetables.  Non-processed food is preferable to packaged items.  Eat at least a few crunchy green vegetables if having a snack, which should be 2-3 hours after your mealtimes(prepare these ahead of time for ease of use).  Drink 64 oz -80 oz of water daily for most, some of you will need more and we'll discuss it at your visit if that is the case.      When changing our diet,  we can often mistake thirst for hunger or just have some distracted eating habits that we need to break free from ('bored/mindless eating', screen time,work, driving,etc).  A glass of water and reconsideration of our hunger is often all that is needed.  Having the urge is not the problem, but watching it pass by without acting on it is the goal.    If you re having hunger problems, add a protein drink/snack to your morning hours or afternoon snack with at least 20grams of protein and not too much sugar (under 10g).  A carton of higher protein/low sugar yogurt can work as well.  If the urge to snack is overwhelming and not satiated, try going for a 10 minute walk/exercise, come home and drink a glass of water and if still hungry, have a  calorie snack (handful of raw/sprouted nuts, veggies and string cheese, protein bar, etc).  Savor it.    It is better to have a large breakfast, a moderate lunch and a smaller dinner to fuel your day.  People lose 10-15% more weight during their weight loss season with this strategy. Optimizing your protein intake at each meal will further keep you more satisfied while eating less food overall.  Getting exercise in early has also been shown to offer the best results (before breakfast ideally but anytime is the right time to exercise if that is not an option for you).    To make sure you re getting adequate vitamins and minerals during weight loss, I recommend one complete multivitamin a day of your choice.  Consider a probiotic and taking some vitamin D 2000 IU  daily.    Let supper be your last meal of the day and ideally try to have at least 12 hours between supper and breakfast the next day to tap into some beneficial overnight fasting dynamics.  Midnight snacks need to go away. Water in the evening is fine, unsweetened, non caffeinated herbal tea is helpful as well.  Consolidating your meals within a 8-12 hour period of your day will help tap into these additional metabolic benefits and tends to keep your appetite up for breakfast, further helping to stay on track.  For most of my patients, I don't recommend an intermittent fasting style diet (many find it hard to fit in their lifestyle) but an overnight fast is very doable for most patients and helps regulate our hunger drives a little better.  This makes it very important to nail good intake at all three meals to feel satisfied/energized and still lose weight.      If evening snacking desires are high, consider a glass of fiber supplement for some additional fullness (metamucil or similar). Most of us don't get the 25-30 grams per day of fiber that promotes good gut health/satiety.  Benefiber, metamucil, citrucel are reasonable/affordable options for most people.  Inulin, chicory, psyllium husk are reasonable options but start slow and low in the dose to avoid gas/bloating until your gut gets acclimated (ramping up to 5-10 grams per day of supplemental fiber after 3-4 weeks if needed).      Example Meal Plan:  Breakfast: 450-475 Calories  1 egg cooked on low in olive oil:   calories.  5oz Greek Yogurt ([place]age plain classic: ~150 erick)  Handful of Berries of your choice (about a calorie per berry or 20-40cal per handful)    cup(cooked) of  old fashioned oatmeal or 1/2 cup(cooked) steel cut oats. (150 erick)  Sprinkle amount of brown sugar and a pat of butter. (40 erick)  Glass of  Water  Black coffee or unsweetened Tea (0calories).      2-3 hours Later Snack: (195 calories).  Glass of water  One string Cheese (80  calories) or 4 oz creamed cottage cheese (115 calories) with  Crunchy Celery sticks (less than 10 calories per large stalk) 2 stalks. (20 calories)    of a  Large Banana or   of a Large Apple (60 calories):  eat second half at lunch or afternoon snack.     Lunch:300 -350 calories   Chicken Breast  (baked/broiled/roasted/grilled)  4-6 oz.  (125-180 erick), BBQ sauce/hot sauce/mustard/seasoning is free. Just use a reasonable amount. Or a can of tuna with 1 tablespoon mayonnaise.  Salad: lettuce, any other veggies (cucumbers, green peppers/celery you like and a small drizzle of dressing to just flavor.  Go as big on the veggies as you like,  as they are practically calorie free.   A whole, 8 inch cucumber is 45 calories, a whole green pepper is 23 calories, a stalk of celery is 9 calories.  [place] Dressing is 60 calories per tablespoon..so moderate your desired dressing or do a drizzle of olive oil and splash of balsamic vinegar on top,  Total calories unlikely to be over 150 even with dressing.  Glass of Water.    Option for lunch is meal replacement protein drink/smoothie.  Need at least 20 grams of protein and eat the rest of your apple/banana from the morning snack.      Afternoon Snack: 150-200 calories   Cheese Stick or cottage cheese again  and a fresh fruit OR  Granola Bar (protein Bar acceptable if under 200 calories OR  Homemade smoothies:  8oz skim milk,  a handful of berries (fresh or frozen and a serving of protein powder such as BiPro or Chela sWhey for example.  If you don't like dairy, make with 8oz water, one small banana, handful of berries and the protein powder, add any veggies you want as well:  roughly 200 calories.   Glass of Water    Dinner: 325 calories  4oz of fresh, [place] salmon.  Broiled (salt/pepper/dill) for about 8-8.5 minutes (200calories) or  4oz filet mignon steak or sirloin steak  Salad or vegetable sautéed lightly in olive oil or   Broccoli 1.5  cups chopped and steamed  or  "micro-waved in a little water (75 calories)  Glass of Water,    Cup of herbal tea (unsweetened, caffeine free)      Herbs and seasonings are encouraged to flavor your foods/vegetables.  Make your food delicious.      Tips for Success:  1.  Prepare proteins ahead of time (broil chicken breasts in bulk so you can grab and go), steel cut oats/lentils can be stored in casserole dish/bowl in the fridge for quick scoop in the morning and rewarm in microwave, make use of crock pot recipes (watch salt content).  Making meals that cover 3-4 future meals is an easy way to stay on track.  2.  Drink a 8-12 oz glass of water every 2-3 hours when awake.  We often mistake hunger for thirst, especially when losing weight.  3. Remember your Reward and Motivation when things get hard.  4.  Weigh yourself every morning and record, you'll stay on track better and learn how our biorhythms, diet and elimination patterns show up on the scale. Don't worry about 1 or 2 day patterns, but when on track you'll notice good trend downward of weight over 3-4 day segments.  Plateaus tend to resolve after 4-8 days in most cases if you stay consistent with your plan.  These are natural and part of weight loss, even if you're perfect with your plan execution.  5. Call if problems/concerns.  [employer] is a great tool to stay in touch and provide weekly outside accountability. Check in with questions or if you want to brag.  6.  Find a handful of meals/foods that keep you on track and feeling good and get into a routine that is sustainable for you.  It's OK to have a routine that works for you.  7.  Consider taking a complete multivitamin just to make sure all micronutrients are adequate during weight loss.  8. If losing hair/brittle nails it usually means you are not taking enough protein.  Minimum goal is 60 grams daily of protein for smaller women, 80 grams a day for men. Consider taking Biotin as supplement or a \"Hair and Nail\" " "multivitamin.      Information about the Weight Loss Medication Phentermine    When combined with mindful eating and behavior changes, weight loss medications can be a nice additional tool to maximize your weight loss season.  There are no magic pills and without diet and behavior changes, weight loss will be minimal.  Think of this medication as a tool to make your diet and behavior changes easier and you'll enjoy a higher probability of success.  Remember not to skip meals, but use this medication to tolerate your reduced calories more easily.  If you are very hungry in the evenings, you are likely not eating enough in the first 10 hours of your day and need to focus on getting your protein requirements in at each of your 3 daily meals.      Phentermine is a stimulant medication related to the amphetamine class of medication but with a lower risk of dependence and addiction.  It is used for weight loss by suppressing the appetite region of the brain.  It also may speed up the metabolic rate and give a person more energy.  Like any medication there are potential side effects and the most common are:  Dry mouth occurs in almost everyone (hydrate well), fewer people experience Palpatations, fast heart rate, elevation of blood pressure, restlessness, insomnia, dizziness, change in mood, tremor, headache, changes in bowel movements,itchiness, changes in sex drive.  If you are or may have become pregnant, do not use phentermine as it increases the risk for birth defects/miscarriage.  Do not use if breastfeeding.    Some people can develop serious side effects which include:  Heart strain (\"ischemia\").  Tachycardia (fast heart rate or irregular heart rate).  Hypertension  Pulmonary Hypertension  Psychosis  Dependency and abuse has occurred in some.  If you've been on high dose (37.5mg) for long periods, phentermine should be tapered down over a few weeks before abruptly stopping as seizures have been reported " rarely.    We do not recommend taking it in combination with the following medications due to potential drug interactions which can increase the risk of side effects and/or potential for seizures:    Absolutely contraindicated are:  Amphetamines or other stimulants like ADHD medication: (dextroamphetamine, amphetamine, diethylpropion, isocarboxazid, methamphetamine, lisdexamfetamine, benzphetamine,dexmethylphenidate, methylphenidate, selegiline patch, sibutramine, tranylcypromine.    Avoid use with:   Dopamine, dobutamine, ephedra, ephedrine, epinephrine, isoproterenol, linezolid, norepinephrine, phenylephrine injection, venlafaxine (Effexor).    Monitor or modify dose with:  Acebutolol, atenolol, betaxolol, bisoprolol, carvedilol, droxidopa, esmolol, labetalol, magnesium citrate, metoprolol, nadolol, nebivolol, penbutolol, pindolol, propranolol, sotalol, timolol.    Caution with: armodafinil,betaxolol eye drops, brexpiprazole, bupropion, busulfan, caffeine, carteolol drops, enzalutaminde, ginseng, green tea, guarana, levobunolol drops, lindane cream, modafinil, afrin nasal spray (oxymetazoline), pamabrom, phenylephrine oral and nasal spray, pseudoephedrine (sudafed), rasgiline, sleegiline, bowel prep, tiagabine, timolol drops,  TRAMADOL due to increased risk of seizures.    The current cheapest place to fill your prescription is at [facility], [facility] or [place] [facility] or [facility] and is around $22for 90 tablets.  Occasionally, [employer] and [employer] have price matched, so call around and get the best price for you.  Other pharmacies may charge closer to$70- $100 for the same prescription. You don't have to be a member to use the pharmacy at [facility] currently.  An alternative some patients have tried is using a voucher system through [employer].  $25 paid on their website gets you a  voucher that can allows you to pick your meds up at [facility] without paying anything more.  [employer] may also  "offer discounted coupons and give prices around you.    Dosing:  We start with half a tablet for the first 2-3 weeks and if tolerating it without problems, you can take up to one full tablet daily in the morning after breakfast.  For those with evening hunger problems, sometimes half a tablet in the morning and half a tablet around 1 pm can be effective, however, risks of nighttime insomnia/restless increase with afternoon dosing so call me at the clinic if considering this regimen or having any issues.  You only have to use the amount effective for you, not to exceed one full tablet.  It can also be used situationaly and does not have to be taken every day. For more sensitive individuals,: get a pill cutter and cut the half tab into quarters and use a quarter of a tablet, about 9mg, for a couple weeks before increasing to half a tablet (18.75mg) if needed.  As always, if any questions give us a call at the [facility] telephone:  [phone].     Don't use Phentermine if sick/ill or using other stimulants/cold medications and it's OK to skip days that you don't feel the need for appetite suppressant assistance.  This medication works the day you take it and doesn't require \"building up\" in the system.      "

## 2023-06-04 ENCOUNTER — HEALTH MAINTENANCE LETTER (OUTPATIENT)
Age: 33
End: 2023-06-04

## 2023-06-28 ENCOUNTER — VIRTUAL VISIT (OUTPATIENT)
Dept: SURGERY | Facility: CLINIC | Age: 33
End: 2023-06-28
Payer: COMMERCIAL

## 2023-06-28 VITALS — BODY MASS INDEX: 42.91 KG/M2 | WEIGHT: 274 LBS

## 2023-06-28 DIAGNOSIS — E66.01 CLASS 3 SEVERE OBESITY DUE TO EXCESS CALORIES WITH BODY MASS INDEX (BMI) OF 40.0 TO 44.9 IN ADULT, UNSPECIFIED WHETHER SERIOUS COMORBIDITY PRESENT (H): Primary | ICD-10-CM

## 2023-06-28 DIAGNOSIS — E66.813 CLASS 3 SEVERE OBESITY DUE TO EXCESS CALORIES WITH BODY MASS INDEX (BMI) OF 40.0 TO 44.9 IN ADULT, UNSPECIFIED WHETHER SERIOUS COMORBIDITY PRESENT (H): Primary | ICD-10-CM

## 2023-06-28 PROCEDURE — 99214 OFFICE O/P EST MOD 30 MIN: CPT | Mod: VID | Performed by: EMERGENCY MEDICINE

## 2023-06-28 RX ORDER — NALTREXONE HYDROCHLORIDE 50 MG/1
TABLET, FILM COATED ORAL
Qty: 90 TABLET | Refills: 0 | Status: SHIPPED | OUTPATIENT
Start: 2023-06-28 | End: 2023-10-12

## 2023-06-28 NOTE — PATIENT INSTRUCTIONS
Plan:   1.  Diet: continue aiming for 80-100g of lean protein daily given your increased strength/aerobic workouts this summer. Hydrate well through the day with 100 oz of water daily.   2. Exercise: continue [employer] videos 4-5 days weekly, great work!  3. Medication: phentermine was affecting sleep so you can stop that. Continue metformin and we'll add in Naltrexone therapy to reduce food cravings:  Start half a tablet (25mg) with supper for 1-2 weeks then increase to half tablet with two meals daily (breakfast and supper or lunch and supper). Stop naltrexone if any need for opiate pain medication  4. Down 8% total body weight thus far, well done. Aiming to get BMI under 40 this summer as a short term goal (42.9 today), roughly under 260 lbs.        LEAN PROTEIN SOURCES  Getting 20-30 grams of protein, 3 meals daily, is appropriate for most people, some need more but more than about 40 grams per meal is not useful.  General rule is drinking one ounce of water per gram of protein eaten over the course of the day:  70 grams of protein each day, drink 70 oz of water.  Protein Source Portion Calories Grams of Protein                           Nonfat, plain Greek yogurt    (10 grams sugar or less) 3/4 cup (6 oz)  12-17   Light Yogurt (10 grams sugar or less) 3/4 cup (6 oz)  6-8   Protein Shake 1 shake 110-180 15-30   Skim/1% Milk or lactose-free milk 1 cup ( 8 oz)  8   Plain or light, flavored soymilk 1 cup  7-8   Plain or light, hemp milk 1 cup 110 6   Fat Free or 1% Cottage Cheese 1/2 cup 90 15   Part skim ricotta cheese 1/2 cup 100 14   Part skim or reduced fat cheese slices 1 ounce 65-80 8     Mozzarella String Cheese 1 80 8   Canned tuna, chicken, crab or salmon  (canned in water)  1/2 cup 100 15-20   White fish (broiled, grilled, baked) 3 ounces 100 21   [place]/Tuna (broiled, grilled, baked) 3 ounces 150-180 21   Shrimp, Scallops, Lobster, Crab 3 ounces 100 21   Pork loin, Pork  Tenderloin 3 ounces 150 21   Boneless, skinless chicken /turkey breast                          (broiled, grilled, baked) 3 ounces 120 21   [place], [place], [place], and Venison 3 ounces 120 21   Lean cuts of red meat and pork (sirloin,   round, tenderloin, flank, ground 93%-96%) 3 ounces 170 21   Lean or Extra Lean Ground Turkey 1/2 cup 150 20   90-95% Lean [place] Burger 1 cinda 140-180 21   Low-fat casserole with lean meat 3/4 cup 200 17   Luncheon Meats                                                        (turkey, lean ham, roast beef, chicken) 3 ounces 100 21   Egg (boiled, poached, scrambled) 1 Egg 60 7   Egg Substitute 1/2 cup 70 10   Nuts (limit to 1 serving per day)  3 Tbsp. 150 7   Nut Rufus (peanut, almond)  Limit to 1 serving or less daily 1 Tbsp. 90 4   Soy Burger (varies) 1  15   Garbanzo, Black, [place] Beans 1/2 cup 110 7   Refried Beans 1/2 cup 100 7   Kidney and Lima beans 1/2 cup 110 7   Tempeh 3 oz 175 18   Vegan crumbles 1/2 cup 100 14   Tofu 1/2 cup 110 14   Chili (beans and extra lean beef or turkey) 1 cup 200 23   Lentil Stew/Soup 1 cup 150 12   Black Bean Soup 1 cup 175 12       Example Meal Plan for a 5294-7642 Calorie Diet:    In order to fuel your weight loss properly and avoid hunger-induced overeating later in the day, for your height and weight, you will enjoy the most success by following the diet below or similar with adjustments based on your particular tastes and preferences.  Exercise may influence speed, amount of weight loss further.     I recommend getting into a meal routine and keeping it similar day to day in the beginning so you don t have to think too hard about what you re going to make/eat.  Keep snacks healthy, ideally containing protein and some vegetables.  Non-processed food is preferable to packaged items.  Eat at least a few crunchy green vegetables if having a snack, which should be 2-3 hours after your mealtimes(prepare these ahead of time for ease of use).   Drink 64 oz -80 oz of water daily for most, some of you will need more and we'll discuss it at your visit if that is the case.      When changing our diet,  we can often mistake thirst for hunger or just have some distracted eating habits that we need to break free from ('bored/mindless eating', screen time,work, driving,etc).  A glass of water and reconsideration of our hunger is often all that is needed.  Having the urge is not the problem, but watching it pass by without acting on it is the goal.    If you re having hunger problems, add a protein drink/snack to your morning hours or afternoon snack with at least 20grams of protein and not too much sugar (under 10g).  A carton of higher protein/low sugar yogurt can work as well.  If the urge to snack is overwhelming and not satiated, try going for a 10 minute walk/exercise, come home and drink a glass of water and if still hungry, have a  calorie snack (handful of raw/sprouted nuts, veggies and string cheese, protein bar, etc).  Savor it.    It is better to have a large breakfast, a moderate lunch and a smaller dinner to fuel your day.  People lose 10-15% more weight during their weight loss season with this strategy. Optimizing your protein intake at each meal will further keep you more satisfied while eating less food overall.  Getting exercise in early has also been shown to offer the best results (before breakfast ideally but anytime is the right time to exercise if that is not an option for you).    To make sure you re getting adequate vitamins and minerals during weight loss, I recommend one complete multivitamin a day of your choice.  Consider a probiotic and taking some vitamin D 2000 IU daily.    Let supper be your last meal of the day and ideally try to have at least 12 hours between supper and breakfast the next day to tap into some beneficial overnight fasting dynamics.  Midnight snacks need to go away. Water in the evening is fine, unsweetened,  non caffeinated herbal tea is helpful as well.  Consolidating your meals within a 8-12 hour period of your day will help tap into these additional metabolic benefits and tends to keep your appetite up for breakfast, further helping to stay on track.  For most of my patients, I don't recommend an intermittent fasting style diet (many find it hard to fit in their lifestyle) but an overnight fast is very doable for most patients and helps regulate our hunger drives a little better.  This makes it very important to nail good intake at all three meals to feel satisfied/energized and still lose weight.      If evening snacking desires are high, consider a glass of fiber supplement for some additional fullness (metamucil or similar). Most of us don't get the 25-30 grams per day of fiber that promotes good gut health/satiety.  Benefiber, metamucil, citrucel are reasonable/affordable options for most people.  Inulin, chicory, psyllium husk are reasonable options but start slow and low in the dose to avoid gas/bloating until your gut gets acclimated (ramping up to 5-10 grams per day of supplemental fiber after 3-4 weeks if needed).      Example Meal Plan:  Breakfast: 450-475 Calories  1 egg cooked on low in olive oil:   calories.  5oz Greek Yogurt (Fage plain classic: ~150 erick)  Handful of Berries of your choice (about a calorie per berry or 20-40cal per handful)    cup(cooked) of  old fashioned oatmeal or 1/2 cup(cooked) steel cut oats. (150 erick)  Sprinkle amount of brown sugar and a pat of butter. (40 erick)  Glass of  Water  Black coffee or unsweetened Tea (0calories).      2-3 hours Later Snack: (195 calories).  Glass of water  One string Cheese (80 calories) or 4 oz creamed cottage cheese (115 calories) with  Crunchy Celery sticks (less than 10 calories per large stalk) 2 stalks. (20 calories)    of a  Large Banana or   of a Large Apple (60 calories):  eat second half at lunch or afternoon snack.     Lunch:300 -350  calories   Chicken Breast  (baked/broiled/roasted/grilled)  4-6 oz.  (125-180 eirck), BBQ sauce/hot sauce/mustard/seasoning is free. Just use a reasonable amount. Or a can of tuna with 1 tablespoon mayonnaise.  Salad: lettuce, any other veggies (cucumbers, green peppers/celery you like and a small drizzle of dressing to just flavor.  Go as big on the veggies as you like,  as they are practically calorie free.   A whole, 8 inch cucumber is 45 calories, a whole green pepper is 23 calories, a stalk of celery is 9 calories.  [place] Dressing is 60 calories per tablespoon..so moderate your desired dressing or do a drizzle of olive oil and splash of balsamic vinegar on top,  Total calories unlikely to be over 150 even with dressing.  Glass of Water.    Option for lunch is meal replacement protein drink/smoothie.  Need at least 20 grams of protein and eat the rest of your apple/banana from the morning snack.      Afternoon Snack: 150-200 calories   Cheese Stick or cottage cheese again  and a fresh fruit OR  Granola Bar (protein Bar acceptable if under 200 calories OR  Homemade smoothies:  8oz skim milk,  a handful of berries (fresh or frozen and a serving of protein powder such as BiPro or Chela sWhey for example.  If you don't like dairy, make with 8oz water, one small banana, handful of berries and the protein powder, add any veggies you want as well:  roughly 200 calories.   Glass of Water    Dinner: 325 calories  4oz of fresh, [place] salmon.  Broiled (salt/pepper/dill) for about 8-8.5 minutes (200calories) or  4oz filet mignon steak or sirloin steak  Salad or vegetable sautéed lightly in olive oil or   Broccoli 1.5  cups chopped and steamed  or micro-waved in a little water (75 calories)  Glass of Water,    Cup of herbal tea (unsweetened, caffeine free)      Herbs and seasonings are encouraged to flavor your foods/vegetables.  Make your food delicious.      Tips for Success:  1.  Prepare proteins ahead of time  "(broil chicken breasts in bulk so you can grab and go), steel cut oats/lentils can be stored in casserole dish/bowl in the fridge for quick scoop in the morning and rewarm in microwave, make use of crock pot recipes (watch salt content).  Making meals that cover 3-4 future meals is an easy way to stay on track.  2.  Drink a 8-12 oz glass of water every 2-3 hours when awake.  We often mistake hunger for thirst, especially when losing weight.  3. Remember your Reward and Motivation when things get hard.  4.  Weigh yourself every morning and record, you'll stay on track better and learn how our biorhythms, diet and elimination patterns show up on the scale. Don't worry about 1 or 2 day patterns, but when on track you'll notice good trend downward of weight over 3-4 day segments.  Plateaus tend to resolve after 4-8 days in most cases if you stay consistent with your plan.  These are natural and part of weight loss, even if you're perfect with your plan execution.  5. Call if problems/concerns.  [employer] is a great tool to stay in touch and provide weekly outside accountability. Check in with questions or if you want to brag.  6.  Find a handful of meals/foods that keep you on track and feeling good and get into a routine that is sustainable for you.  It's OK to have a routine that works for you.  7.  Consider taking a complete multivitamin just to make sure all micronutrients are adequate during weight loss.  8. If losing hair/brittle nails it usually means you are not taking enough protein.  Minimum goal is 60 grams daily of protein for smaller women, 80 grams a day for men. Consider taking Biotin as supplement or a \"Hair and Nail\" multivitamin.  On-the-Go Breakfast Ideas  As of 2015, the latest research shows what a huge impact eating breakfast has on losing weight and feeling your best. People lose more weight when they make breakfast their biggest meal of the day compared to Dinner, but even if you cannot go to that " degree, getting a breakfast that has at least 20 grams of protein and even a moderate amount of fat is ideal for maintaining good energy through the day and limits overeating in the evening hours.  The following are some quick and easy suggestions for at least getting something of substance into your body in the morning.  Enjoy!    Eating breakfast within 90 minutes of waking up is an important part of taking care of your body on a restricted calorie diet plan.  After sleeping for hours, your body is in need of fuel.  An ideal breakfast is a combination of protein, whole-grain carbohydrates, or fruit.  Here s why:    -Protein digests very slowly in the body, helping you feel more satisfied.  -Whole grains provide dietary fiber, which also digests slowly and helps keep your gut clean.  -Fruit is a great source of vitamins, minerals, and fiber.     Each one of these breakfast combinations has between 200-300 calories and 15-20 grams of protein.  Feel free to mix and match!    Bone Broth (chicken bone broth or beef bone broth) is a great way to boost protein content. 8oz of bone broth will typically have 9-12grams of protein for 40kcal of energy.    Protein: Choose  -1/2 cup low-fat cottage cheese  -2 hard boiled eggs , or one cooked in olive oil (low/slow heat).  -1 low fat string cheese stick  -1 I Love QCon natural peanut butter  -[employer] vegetarian sausage cinda (found in freezer section)  -1 slice lowfat cheese  -6 oz 2% or lowfat Greek yogurt, such as Fage or Oikos.    PLUS    Whole Grains:  Choose   -1 whole wheat English muffin  -1 whole wheat missy, half  -1/2 Fiber One frozen muffin, thawed  -1/2 Fiber One toaster pastry  -1 whole wheat bagel thin  -1/2 cup Kashi cereal  -1 Kashi waffle (or other whole grain high-fiber waffle)  Aim for whole grain/sprouted breads with at least 3g of fiber/slice if having bread. [place] is one such brand.    OR    Fruit: Choose  -1/3 cup blueberries  -1/2 banana  (or a plantain- similar to a banana, yet smaller)  -1/2 cup cantaloupe cubes  -1 small apple  -1 small orange  -1/2 cup strawberries  -handful raspberries/blackberries (each berry is about 1 calorie).    *Adapted from [employer], Fall 20    Ten Breakfasts Under 250 calories    Ideally, getting between 350-600 calories  (depending on starting height and weight)for breakfast is ideal for avoiding hunger later in the day, adjust/add to the following accordingly:    One- 250 calories, 8.5 g protein  1 slice whole-grain toast   1 Tbsp peanut butter    banana    Two- 250 calories, 8 g protein    cup nonfat/lowfat yogurt  1/3rd cup diced no-sugar peaches  1/3rd cup cereal (like Special K, Cheerios, or bran flakes)    Three- 250 calories, 25 g protein  1 egg scrambled with 1 oz skim milk    cup shredded cheddar    whole grain English muffin  1 oz [place] nelson  1 tsp margarine spread    Four- 225 calories, 25 g protein  1/2 cup Kashi Go-Lean cereal    cup skim milk mixed with 1 scoop Bariatric Advantage protein powder    cup no-sugar diced pears    Five- 250 calories, 20 g protein    cup oatmeal prepared with skim milk, 1 scoop protein powder, and sugar-free maple syrup    Six- 200 calories, 5 g protein  1 whole grain waffle, toasted  1 tablespoon creamy peanut or almond butter    Seven-  250 calories, 19 g protein  Breakfast sandwich: 1 slice whole grain toast, cut in half.  Add 1 scrambled egg and one slice cheddar  cheese.    Eight-  250 calories, 15 g protein  2 eggs scrambled with 1/3 cup frozen spinach (heat before adding to eggs) and 2 tablespoons low fat cream cheese.    Nine-  150 calories, 15 g protein  2/3rd cup cottage cheese    cup cantaloupe    Ten- 200 calories, 20 g protein  Fruit smoothie made with 4 oz. nonfat Greek yogurt,   cup berries, 1 scoop protein powder, and 4 oz skim milk.    Ten Lunches Under 250 Calories    Aim for lunch to be around 300-400 calories a day when trying to lose weight and  get that protein in!    One- 200 calories, 11 g protein  1/3 cup tuna salad made with light gonzales on 1 slice whole grain bread  1 small peeled apple    Two- 250 calories, 16 g protein  1/3 cup lowfat cottage cheese    cup cooked green beans    small fruit cocktail (in natural juice)    Three- 200 calories, 11 g protein    grilled cheese sandwich on whole grain bread with lowfat cheese  2/3rd cup of tomato soup    Four- 250 calories, 22 g protein  Deli wrap: 1 oz sliced turkey, 1 oz sliced ham, 1 oz sliced chicken rolled up with 1 slice low-fat cheese  1 small orange    Five- 250 calories, 28 g protein  2/3rd cup chili with 1 oz shredded cheese  4 saltine crackers    Six- 250 calories, 22 g protein  1 cup fresh spinach with 2 oz chicken, 1/3rd cup mandarin oranges, and 2 tablespoons sliced almonds with 1 tablespoon  vinaigrette dressing    Seven- 200 calories, 11 g protein  1 Tbsp sugar-free preserves and 1 Tbsp peanut butter on 1 slice whole grain toast    cup nonfat/lowfat Greek yogurt    Eight- 250 calories, 18 g protein  1 small soft-shell chicken taco with 1 oz shredded cheese, lettuce, tomato, salsa, and 1 Tbsp light sour cream    cup black beans    Nine- 225 calories, 13 g protein  2 ounces baked chicken  1/4 cup mashed potatoes    cup green beans    Ten- 200 calories, 21 g protein  Deli missy: 2 oz roast beef or other deli meat with 1 tsp [place] mayonnaise and sliced tomato, onion, and lettuce  1/3rd cup cottage cheese      Ten Dinners Under 300 calories    If you're eating a large breakfast and medium lunch, keep dinner small.  300-400 calories is ideal for most people depending on their caloric needs.    One- 300 calories, 12 g protein  1-inch thick slice of turkey meatloaf    cup baked butternut squash    Two- 200 calories, 9 g protein  Bread-less BLT: 3 slices turkey nelson, sliced tomato, wrapped in a large lettuce leaf    cup peeled fruit    Three- 275 calories, 36 g protein  3 oz roasted chicken    cup  cooked broccoli    cup shredded cheddar cheese    cup unsweetened applesauce    Four- 200 calories, 25 g protein  3 oz baked tilapia  1/3rd cup cooked carrots    cup yogurt    Five- 250 calories, 20 g protein  Grilled ham  n  Swiss: spread 2 tsp ghee or butter on 1 slice of whole grain bread.  Cut bread in half, layer 2 oz deli ham with 1 piece of Swiss cheese and grill until cheese is melted.    cup cooked vegetables    Six- 250 calories, 18 g protein  Vegetarian cheeseburger: 1 [place] cheeseburger topped with lettuce, onion, tomato, and ketchup/mustard    cup sweet potato fries    Seven- 250 calories, 18 g protein  Pork pot roast: 2 oz roasted pork loin, 1/3rd cup roasted carrots,   medium potato, cooked with   cup gravy    Eight- 330 calories, 25 g protein  2 oz meatballs (about 2 small meatballs)    cup spaghetti sauce  1/2 piece toast topped with 1 tsp ghee or butterand topped with garlic powder, toasted in oven    Nine- 250 calories, 16 g protein  Mexican pizza: one 8  corn tortilla topped with 2 oz chicken,   cup salsa, 2 tablespoons black beans, 2 tablespoons shredded cheese.  Bake until cheese is melted.    Ten- 250 calories, 22 g protein  Shrimp stir-[place]ke[place]: 3 oz cooked shrimp, 1/6th onion,   pepper,   cup chopped carrots sautéed in 1 tablespoon olive oil, topped with 2 tablespoons stir mckeon sauce and a pinch of sesame seeds        150 Calories or Less Snack Ideas   1 hardboiled egg with   cup berries  1 small apple with 1 hardboiled egg  10 almonds with   cup berries  2 clementines with 1 light string cheese  1 light string cheese with   sliced apple  1 light string cheese wrapped in 2 slices of turkey  4 100% whole wheat crackers (e.g. Triscuit) with 1 light string cheese    c. cottage cheese with   cup fruit and 1 Tbsp sunflower seeds     cup cottage cheese with   of an avocado     can tuna fish with 1 cup sliced cucumbers     cup roasted garbanzo beans with paprika and cayenne pepper    baked sweet potato  with   cup chili beans or   cup cottage cheese  2 oz. nitrate free turkey slices with 1 cup carrots  1 container (6 oz) of low sugar (less than 10 grams of sugar) greek yogurt   3 Tablespoons of hummus with 1 cup sliced bell peppers   2 Tablespoons of hummus with 15 baby carrots  4 Tablespoons ranch dip made with plain Greek Yogurt and 3 mini cucumbers  1/4 cup nuts (any kind)  1 Tablespoon peanut butter with 1 stalk celery   1 dill pickle wrapped in 1-2 slices of deli ham with 1 tsp of mayonnaise/mustard.

## 2023-06-28 NOTE — PROGRESS NOTES
Bariatric Clinic Follow-Up Visit:    Nirali Hughes is a 32 year old  female with Body mass index is 42.91 kg/m .  presenting here today for follow-up on non-surgical efforts for weight loss. Original Intake visit occurred on 2/10/23 with some interest in our surgical program but ultimately decided to pursue non surgical weight loss this year due to recent divorce/stressors and limited self care time.  She started at that visit with a weight of 298 lbs and BMI of 46.7.  Along with diet and behavior changes, she has been using phentermine and metformin (her insurance this year doesn't affordably cover Wegovy) to assist her weight loss goals.  See her intake visit notes for details on identified contributors to weight gain in the past. Chart review shows Dietician have not  happened so this was rescheduled today for now the third attempt after she cancelled her last dietician visit following our April '23 meeting. She did not follow up at that visit either and upon review her reluctance to meet with our dietician is driven by conflicts with her  needs that caused cancellation. We'll reschedule again today and I've recommended food tracking to have discussion about how things are going with her nutritional plan when she sees the dietician..    2/10/23 labs showed low vitamin D at 18mcg/L, normal thyroid function, CBC, CMP and A1c of 5.4%. Weekly 1250mcg D2 boost was prescribed for 12 weeks to be followed by 50mcg/day of D3 for supplementation. She's finished that and using 50mcg/ OTC vitamin D3 daily.  Weight:   Wt Readings from Last 5 Encounters:   06/28/23 124.3 kg (274 lb)   04/14/23 131.5 kg (289 lb 12.8 oz)   02/10/23 135.2 kg (298 lb)   10/17/19 122.3 kg (269 lb 9.6 oz)   10/10/19 122 kg (269 lb)    pounds      Comorbidities:  Patient Active Problem List   Diagnosis     Morbid obesity (H)     Normal delivery     Leiomyoma of uterus affecting pregnancy     Insulin controlled gestational diabetes  mellitus (GDM) in third trimester       Current Outpatient Medications:      metFORMIN (GLUCOPHAGE) 500 MG tablet, Start half a tablet for 2 weeks, then increase to one full tablet with supper if tolerated., Disp: 90 tablet, Rfl: 0     naltrexone (DEPADE/REVIA) 50 MG tablet, Start half a tablet daily with supper for 7 days then increase, if tolerated to half a tablet with breakfast and supper., Disp: 90 tablet, Rfl: 0     calcium carbonate (OS-FABIOLA) 500 MG tablet, Take 1 tablet by mouth 2 times daily, Disp: , Rfl:      magnesium 250 MG tablet, Take 1 tablet by mouth daily, Disp: , Rfl:      Multiple Vitamin (MULTIVITAMIN ADULT PO), , Disp: , Rfl:      phentermine (ADIPEX-P) 37.5 MG tablet, Start half tablet daily after breakfast. (Patient not taking: Reported on 6/28/2023), Disp: 45 tablet, Rfl: 0     vitamin D3 (CHOLECALCIFEROL) 50 mcg (2000 units) tablet, Take 1 tablet by mouth daily, Disp: , Rfl:       Interim: Since our last visit, she has stopped phentermine due to insomnia issues.  Lifting 4-5 days weekly now, using Beach Body program. Cardio/flexibility.  24 lb reduction overall. 8% body weight reduction.  Protein focus has been helpful. Finds that buying some [employer] protein shakes works well to supplement. She     Plan:   1.  Diet: continue aiming for 80-100g of lean protein daily given your increased strength/aerobic workouts this summer. Hydrate well through the day with 100 oz of water daily.   2. Exercise: continue Beach Body videos 4-5 days weekly, great work!  3. Medication: phentermine was affecting sleep so you can stop that. Continue metformin and we'll add in Naltrexone therapy to reduce food cravings:  Start half a tablet (25mg) with supper for 1-2 weeks then increase to half tablet with two meals daily (breakfast and supper or lunch and supper). Stop naltrexone if any need for opiate pain medication  4. Down 8% total body weight thus far, well done. Aiming to get BMI under 40 this summer as a short  term goal (42.9 today), roughly under 260 lbs.         We discussed [facility] Bariatric Basics including:  -eating 3 meals daily  -reviewed metabolic needs for weight loss based on Resting Metabolic Rate  -protein goals supportive of healthy weight loss  -avoiding/limiting calorie containing beverages  -We discussed the importance of restorative sleep and stress management in maintaining a healthy weight.  -We discussed the National Weight Control Registry healthy weight maintenance strategies and ways to optimize metabolism.  -We discussed the importance of physical activity including cardiovascular and strength training in maintaining a healthier weight and explored viable options.      Most recent labs:  Lab Results   Component Value Date    WBC 7.7 02/10/2023    HGB 14.7 02/10/2023    HCT 43.1 02/10/2023    MCV 86 02/10/2023     02/10/2023     No results found for: CHOL  No results found for: HDL  No components found for: LDLCALC  No results found for: TRIG  No results found for: CHOLHDL  Lab Results   Component Value Date    ALT 22 02/10/2023    AST 27 02/10/2023    ALKPHOS 71 02/10/2023     No results found for: HGBA1C  Lab Results   Component Value Date    B12 1,071 02/10/2023     No components found for: VITDT1  No results found for: ANTOLIN  No results found for: PTHI  No results found for: ZN  No results found for: VIB1WB  Lab Results   Component Value Date    TSH 1.03 02/10/2023     No results found for: TEST    DIETARY HISTORY  Tracking technique: regular  Positive Changes Since Last Visit: protein needs met but relies on shakes more than average.  Struggling With: not much right now.     Getting Adequate Protein: yes  Sleep schedule: improved since coming off phentermine.      PHYSICAL ACTIVITY PATTERNS:  Cardiovascular: beach body videos: strength/HIIT/stretching.  Strength Training: same    REVIEW OF SYSTEMS  Feels happy about progress..  PHYSICAL EXAM:  Vitals: Wt 124.3 kg (274 lb)   BMI 42.91  kg/m    Weight:   Wt Readings from Last 3 Encounters:   06/28/23 124.3 kg (274 lb)   04/14/23 131.5 kg (289 lb 12.8 oz)   02/10/23 135.2 kg (298 lb)         GEN: Pleasant, well groomed, in no acute distress  HEENT:  Normal face .  NECK: thick  HEART: .  LUNGS: No respiratory difficulty noted. No cough. .  ABDOMEN: .  EXTREMITIES: No tremor. ..  NEURO: Alert and Oriented X3, fluent speech. .  SKIN: No visible rashes. .    Interim study results: none.      30 minutes spent by me on the date of the encounter doing chart review, history and exam, documentation and further activities per the note   Rajeev Figueroa MD  [facility]  9:31 PM  6/27/2023

## 2023-06-28 NOTE — LETTER
6/28/2023         RE: Nirali Hughes  [address] [place] [address]        Dear Colleague,    Thank you for referring your patient, Nirali Hughes, to the [facility] AND [facility]ATR[facility] CARE [place]. Please see a copy of my visit note below.    Bariatric Clinic Follow-Up Visit:    Nirali Hughes is a 32 year old  female with Body mass index is 42.91 kg/m .  presenting here today for follow-up on non-surgical efforts for weight loss. Original Intake visit occurred on 2/10/23 with some interest in our surgical program but ultimately decided to pursue non surgical weight loss this year due to recent divorce/stressors and limited self care time.  She started at that visit with a weight of 298 lbs and BMI of 46.7.  Along with diet and behavior changes, she has been using phentermine and metformin (her insurance this year doesn't affordably cover Wegovy) to assist her weight loss goals.  See her intake visit notes for details on identified contributors to weight gain in the past. Chart review shows Dietician have not  happened so this was rescheduled today for now the third attempt after she cancelled her last dietician visit following our April '23 meeting. She did not follow up at that visit either and upon review her reluctance to meet with our dietician is driven by conflicts with her  needs that caused cancellation. We'll reschedule again today and I've recommended food tracking to have discussion about how things are going with her nutritional plan when she sees the dietician..    2/10/23 labs showed low vitamin D at 18mcg/L, normal thyroid function, CBC, CMP and A1c of 5.4%. Weekly 1250mcg D2 boost was prescribed for 12 weeks to be followed by 50mcg/day of D3 for supplementation. She's finished that and using 50mcg/ OTC vitamin D3 daily.  Weight:   Wt Readings from Last 5 Encounters:   06/28/23 124.3 kg (274 lb)   04/14/23 131.5 kg (289 lb 12.8 oz)    02/10/23 135.2 kg (298 lb)   10/17/19 122.3 kg (269 lb 9.6 oz)   10/10/19 122 kg (269 lb)    pounds      Comorbidities:  Patient Active Problem List   Diagnosis     Morbid obesity (H)     Normal delivery     Leiomyoma of uterus affecting pregnancy     Insulin controlled gestational diabetes mellitus (GDM) in third trimester       Current Outpatient Medications:      metFORMIN (GLUCOPHAGE) 500 MG tablet, Start half a tablet for 2 weeks, then increase to one full tablet with supper if tolerated., Disp: 90 tablet, Rfl: 0     naltrexone (DEPADE/REVIA) 50 MG tablet, Start half a tablet daily with supper for 7 days then increase, if tolerated to half a tablet with breakfast and supper., Disp: 90 tablet, Rfl: 0     calcium carbonate (OS-FABIOLA) 500 MG tablet, Take 1 tablet by mouth 2 times daily, Disp: , Rfl:      magnesium 250 MG tablet, Take 1 tablet by mouth daily, Disp: , Rfl:      Multiple Vitamin (MULTIVITAMIN ADULT PO), , Disp: , Rfl:      phentermine (ADIPEX-P) 37.5 MG tablet, Start half tablet daily after breakfast. (Patient not taking: Reported on 6/28/2023), Disp: 45 tablet, Rfl: 0     vitamin D3 (CHOLECALCIFEROL) 50 mcg (2000 units) tablet, Take 1 tablet by mouth daily, Disp: , Rfl:       Interim: Since our last visit, she has stopped phentermine due to insomnia issues.  Lifting 4-5 days weekly now, using Beach Body program. Cardio/flexibility.  24 lb reduction overall. 8% body weight reduction.  Protein focus has been helpful. Finds that buying some [employer] protein shakes works well to supplement. She     Plan:   1.  Diet: continue aiming for 80-100g of lean protein daily given your increased strength/aerobic workouts this summer. Hydrate well through the day with 100 oz of water daily.   2. Exercise: continue Beach Body videos 4-5 days weekly, great work!  3. Medication: phentermine was affecting sleep so you can stop that. Continue metformin and we'll add in Naltrexone therapy to reduce food cravings:  Start  half a tablet (25mg) with supper for 1-2 weeks then increase to half tablet with two meals daily (breakfast and supper or lunch and supper). Stop naltrexone if any need for opiate pain medication  4. Down 8% total body weight thus far, well done. Aiming to get BMI under 40 this summer as a short term goal (42.9 today), roughly under 260 lbs.         We discussed [facility] Bariatric Basics including:  -eating 3 meals daily  -reviewed metabolic needs for weight loss based on Resting Metabolic Rate  -protein goals supportive of healthy weight loss  -avoiding/limiting calorie containing beverages  -We discussed the importance of restorative sleep and stress management in maintaining a healthy weight.  -We discussed the National Weight Control Registry healthy weight maintenance strategies and ways to optimize metabolism.  -We discussed the importance of physical activity including cardiovascular and strength training in maintaining a healthier weight and explored viable options.      Most recent labs:  Lab Results   Component Value Date    WBC 7.7 02/10/2023    HGB 14.7 02/10/2023    HCT 43.1 02/10/2023    MCV 86 02/10/2023     02/10/2023     No results found for: CHOL  No results found for: HDL  No components found for: LDLCALC  No results found for: TRIG  No results found for: CHOLHDL  Lab Results   Component Value Date    ALT 22 02/10/2023    AST 27 02/10/2023    ALKPHOS 71 02/10/2023     No results found for: HGBA1C  Lab Results   Component Value Date    B12 1,071 02/10/2023     No components found for: VITDT1  No results found for: ANTOLIN  No results found for: PTHI  No results found for: ZN  No results found for: VIB1WB  Lab Results   Component Value Date    TSH 1.03 02/10/2023     No results found for: TEST    DIETARY HISTORY  Tracking technique: regular  Positive Changes Since Last Visit: protein needs met but relies on shakes more than average.  Struggling With: not much right now.     Getting Adequate  Protein: yes  Sleep schedule: improved since coming off phentermine.      PHYSICAL ACTIVITY PATTERNS:  Cardiovascular: beach body videos: strength/HIIT/stretching.  Strength Training: same    REVIEW OF SYSTEMS  Feels happy about progress..  PHYSICAL EXAM:  Vitals: Wt 124.3 kg (274 lb)   BMI 42.91 kg/m    Weight:   Wt Readings from Last 3 Encounters:   06/28/23 124.3 kg (274 lb)   04/14/23 131.5 kg (289 lb 12.8 oz)   02/10/23 135.2 kg (298 lb)         GEN: Pleasant, well groomed, in no acute distress  HEENT:  Normal face .  NECK: thick  HEART: .  LUNGS: No respiratory difficulty noted. No cough. .  ABDOMEN: .  EXTREMITIES: No tremor. ..  NEURO: Alert and Oriented X3, fluent speech. .  SKIN: No visible rashes. .    Interim study results: none.      30 minutes spent by me on the date of the encounter doing chart review, history and exam, documentation and further activities per the note   Rajeev Figueroa MD  [facility]  9:31 PM  6/27/2023      Again, thank you for allowing me to participate in the care of your patient.        Sincerely,        Rajeev Figueroa MD

## 2023-07-10 ENCOUNTER — VIRTUAL VISIT (OUTPATIENT)
Dept: SURGERY | Facility: CLINIC | Age: 33
End: 2023-07-10
Payer: COMMERCIAL

## 2023-07-10 DIAGNOSIS — E66.01 MORBID OBESITY WITH BMI OF 45.0-49.9, ADULT (H): Primary | ICD-10-CM

## 2023-07-10 PROCEDURE — 97802 MEDICAL NUTRITION INDIV IN: CPT | Mod: VID | Performed by: DIETITIAN, REGISTERED

## 2023-07-10 NOTE — LETTER
7/10/2023         RE: Nirali Hughes  [address] [place] [address]        Dear Colleague,    Thank you for referring your patient, Nirali Hughes, to the [facility] AND [facility]ATRICS C[facility] [place]. Please see a copy of my visit note below.    Nirali Hughes is a 32 year old who is being evaluated via a billable video visit.      Medical Weight Loss Initial Diet Evaluation  Assessment:  This patient was referred by Dr. Figueroa for MNT as treatment for Morbid obesity.  Nirali is presenting today for a new weight management nutrition consultation. Pt has had an initial appointment with Dr. Figueroa.    Weight loss medication: Metformin and Naltrexone.    Personal Goals: She feels good around 195-200 lb range, be healthy for her kids and feel better     Anthropometrics:    Pt's weight is 275 lb  Initial weight: highest was 310 lbs earlier this year  Weight change: -35 lbs  BMI: There is no height or weight on file to calculate BMI.   Ideal body weight: 61.6 kg (135 lb 12.9 oz)  Adjusted ideal body weight: 86.7 kg (191 lb 1.3 oz)  Estimated RMR (Phelps-St Jeor equation):  1998 kcals x 1.2 (sedentary) = 2398 kcals (for weight maintenance)  1998 kcals x 1.3 (light active) = 2747 kcals (for weight maintenance)    Recommended Protein Intake:  grams of protein/day    Medical History:  Patient Active Problem List   Diagnosis     Morbid obesity (H)     Normal delivery     Leiomyoma of uterus affecting pregnancy     Insulin controlled gestational diabetes mellitus (GDM) in third trimester      Diabetes: no  HbA1c:  No results found for: HGBA1C    Nutrition History:   Weight loss history: Gained weight after two pregnancies, overindulged with family events, and went through a divorce a year. She used to use food as a comfort but now has a much healthier relationship with food and the scale    Dietary Recall:  Breakfast: protein shake - 30 g   If she has time at 9 am:  Toast, egg, avocado, nelson/sausage  Lunch: Noon protein bar  2 pm: salad kit (half)  Works out at 4 pm: 15-20 minutes  Dinner: 8:30-9 pm similar to lunch: Other half of salad kit for dinner OR fish/chicken    Feeling more satisfied on smaller portions     Beverages:   Struggling with water intake: 24 oz, 2-3 per day    Exercise: lifting more weights    Nutrition Diagnosis (PES statement):   Morbid obesity related to excessive energy intake as evidence by poor relationship with food, food as comfort, large portions and BMI of 45.39     Nutrition Intervention  1. Food and/or Nutrient Delivery   a. Placed emphasis on importance of developing a healthy meal routine, aiming for 3 meals a day  2. Nutrition Education   a. Educated on sources of lean protein, portion sizes, the amount of grams found in each source. Recommend patient to aim for 20-30g protein at each meal.  b. Discussed the importance of adequate hydration, with emphasis on drinking 64 oz of water or zero calorie beverages per day.  3. Nutrition Counseling   a. Encouraged importance of developing routine exercise for health benefits and weight loss.    Goals established by patient:   1. Increase water intake - 3 bottles of water per day  2. Doing fun physical activity outdoors 2-3x by September   3. Lose 15-20 lb by September    Assessment/Plan:    Pt will follow up in 3 month(s) with bariatrician and 2 month(s) with dietitian.     Video-Visit Details    Type of service:  Video Visit    Video Start Time (time video started): 4:31 pm    Video End Time (time video stopped): 5:05 pm    Originating Location (pt. Location): Home        Distant Location (provider location):  Off-site    Mode of Communication:  Video Conference via [facility]    Physician has received verbal consent for a Video Visit from the patient? Yes      Virginia Llanos RD        Again, thank you for allowing me to participate in the care of your patient.        Sincerely,        [place]  ERIC Llanos

## 2023-07-10 NOTE — PROGRESS NOTES
Nirali Hughes is a 32 year old who is being evaluated via a billable video visit.      Medical Weight Loss Initial Diet Evaluation  Assessment:  This patient was referred by Dr. Figueroa for MNT as treatment for Morbid obesity.  Nirali is presenting today for a new weight management nutrition consultation. Pt has had an initial appointment with Dr. Figueroa.    Weight loss medication: Metformin and Naltrexone.    Personal Goals: She feels good around 195-200 lb range, be healthy for her kids and feel better     Anthropometrics:    Pt's weight is 275 lb  Initial weight: highest was 310 lbs earlier this year  Weight change: -35 lbs  BMI: There is no height or weight on file to calculate BMI.   Ideal body weight: 61.6 kg (135 lb 12.9 oz)  Adjusted ideal body weight: 86.7 kg (191 lb 1.3 oz)  Estimated RMR (Bent-St Jeor equation):  1998 kcals x 1.2 (sedentary) = 2398 kcals (for weight maintenance)  1998 kcals x 1.3 (light active) = 2747 kcals (for weight maintenance)    Recommended Protein Intake:  grams of protein/day    Medical History:  Patient Active Problem List   Diagnosis     Morbid obesity (H)     Normal delivery     Leiomyoma of uterus affecting pregnancy     Insulin controlled gestational diabetes mellitus (GDM) in third trimester      Diabetes: no  HbA1c:  No results found for: HGBA1C    Nutrition History:   Weight loss history: Gained weight after two pregnancies, overindulged with family events, and went through a divorce a year. She used to use food as a comfort but now has a much healthier relationship with food and the scale    Dietary Recall:  Breakfast: protein shake - 30 g   If she has time at 9 am: Toast, egg, avocado, nelson/sausage  Lunch: Noon protein bar  2 pm: salad kit (half)  Works out at 4 pm: 15-20 minutes  Dinner: 8:30-9 pm similar to lunch: Other half of salad kit for dinner OR fish/chicken    Feeling more satisfied on smaller portions     Beverages:   Struggling with water  intake: 24 oz, 2-3 per day    Exercise: lifting more weights    Nutrition Diagnosis (PES statement):   Morbid obesity related to excessive energy intake as evidence by poor relationship with food, food as comfort, large portions and BMI of 45.39     Nutrition Intervention  1. Food and/or Nutrient Delivery   a. Placed emphasis on importance of developing a healthy meal routine, aiming for 3 meals a day  2. Nutrition Education   a. Educated on sources of lean protein, portion sizes, the amount of grams found in each source. Recommend patient to aim for 20-30g protein at each meal.  b. Discussed the importance of adequate hydration, with emphasis on drinking 64 oz of water or zero calorie beverages per day.  3. Nutrition Counseling   a. Encouraged importance of developing routine exercise for health benefits and weight loss.    Goals established by patient:   1. Increase water intake - 3 bottles of water per day  2. Doing fun physical activity outdoors 2-3x by September   3. Lose 15-20 lb by September    Assessment/Plan:    Pt will follow up in 3 month(s) with bariatrician and 2 month(s) with dietitian.     Video-Visit Details    Type of service:  Video Visit    Video Start Time (time video started): 4:31 pm    Video End Time (time video stopped): 5:05 pm    Originating Location (pt. Location): Home        Distant Location (provider location):  Off-site    Mode of Communication:  Video Conference via [facility]    Physician has received verbal consent for a Video Visit from the patient? Yes      Virginia Llanos RD

## 2023-10-12 ENCOUNTER — VIRTUAL VISIT (OUTPATIENT)
Dept: SURGERY | Facility: CLINIC | Age: 33
End: 2023-10-12
Payer: COMMERCIAL

## 2023-10-12 ENCOUNTER — TELEPHONE (OUTPATIENT)
Dept: SURGERY | Facility: CLINIC | Age: 33
End: 2023-10-12

## 2023-10-12 VITALS — HEIGHT: 67 IN | WEIGHT: 254.8 LBS | BODY MASS INDEX: 39.99 KG/M2

## 2023-10-12 DIAGNOSIS — E66.01 CLASS 3 SEVERE OBESITY DUE TO EXCESS CALORIES WITHOUT SERIOUS COMORBIDITY WITH BODY MASS INDEX (BMI) OF 45.0 TO 49.9 IN ADULT (H): ICD-10-CM

## 2023-10-12 DIAGNOSIS — E66.812 CLASS 2 OBESITY DUE TO EXCESS CALORIES WITHOUT SERIOUS COMORBIDITY IN ADULT, UNSPECIFIED BMI: Primary | ICD-10-CM

## 2023-10-12 DIAGNOSIS — E66.09 CLASS 2 OBESITY DUE TO EXCESS CALORIES WITHOUT SERIOUS COMORBIDITY IN ADULT, UNSPECIFIED BMI: Primary | ICD-10-CM

## 2023-10-12 DIAGNOSIS — E66.813 CLASS 3 SEVERE OBESITY DUE TO EXCESS CALORIES WITHOUT SERIOUS COMORBIDITY WITH BODY MASS INDEX (BMI) OF 45.0 TO 49.9 IN ADULT (H): ICD-10-CM

## 2023-10-12 PROCEDURE — 99214 OFFICE O/P EST MOD 30 MIN: CPT | Mod: VID | Performed by: EMERGENCY MEDICINE

## 2023-10-12 RX ORDER — PHENTERMINE HYDROCHLORIDE 37.5 MG/1
TABLET ORAL
Qty: 45 TABLET | Refills: 0 | Status: SHIPPED | OUTPATIENT
Start: 2023-10-12 | End: 2024-01-16

## 2023-10-12 NOTE — PATIENT INSTRUCTIONS
Plan:   1.  Diet: aiming for 1800-1900kcal/day with 90-110grams of lean protein daily to support 5 day weekly strength training program, 3-4 hours a week. Consider recovery snack of 100-150kcal and 8-12g of protein if hard workouts lasting over 45 minutes.     2. Exercise: 5 day weekly video strength training. Great job! It would be good to measure body fat to see how body composition changes over your program. If desired, call the clinic to come in for weight check on our [place] scale which provides good estimate of body fat.     3. Medication: restart phentermine, half tablet after breakfast if tolerated. If sleep disruption, consider a pill cutter and using quarter tablet to start for a couple weeks before increasing to half tablet. Don't use on sick days or if under too much stress if finding it to be too stimulating. Don't mix with cold medications or other stimulants. Stop if chest pain/headaches/racing heart beats or mood swings. Avoid pregnancy while using.    Continue metformin. Refill sent.    4. Down over 14% total body weight thus far. We'll continue to focus on non surgical support the next couple years given your great results thus far.    5. Goals: BMI is now under 40, dropping out of the highest risk category of excess weight. A good medium to long term goal is to stabilize BMI under 35, 220 lbs or less.          LEAN PROTEIN SOURCES  Getting 20-30 grams of protein, 3 meals daily, is appropriate for most people, some need more but more than about 40 grams per meal is not useful.  General rule is drinking one ounce of water per gram of protein eaten over the course of the day:  70 grams of protein each day, drink 70 oz of water.  Protein Source Portion Calories Grams of Protein                           Nonfat, plain Greek yogurt    (10 grams sugar or less) 3/4 cup (6 oz)  12-17   Light Yogurt (10 grams sugar or less) 3/4 cup (6 oz)  6-8   Protein Shake 1 shake 110-180 15-30   Skim/1% Milk  or lactose-free milk 1 cup ( 8 oz)  8   Plain or light, flavored soymilk 1 cup  7-8   Plain or light, hemp milk 1 cup 110 6   Fat Free or 1% Cottage Cheese 1/2 cup 90 15   Part skim ricotta cheese 1/2 cup 100 14   Part skim or reduced fat cheese slices 1 ounce 65-80 8     Mozzarella String Cheese 1 80 8   Canned tuna, chicken, crab or salmon  (canned in water)  1/2 cup 100 15-20   White fish (broiled, grilled, baked) 3 ounces 100 21   [place]/Tuna (broiled, grilled, baked) 3 ounces 150-180 21   Shrimp, Scallops, Lobster, Crab 3 ounces 100 21   Pork loin, Pork Tenderloin 3 ounces 150 21   Boneless, skinless chicken /turkey breast                          (broiled, grilled, baked) 3 ounces 120 21   [place], [place], [place], and Venison 3 ounces 120 21   Lean cuts of red meat and pork (sirloin,   round, tenderloin, flank, ground 93%-96%) 3 ounces 170 21   Lean or Extra Lean Ground Turkey 1/2 cup 150 20   90-95% Lean [place] Burger 1 cinda 140-180 21   Low-fat casserole with lean meat 3/4 cup 200 17   Luncheon Meats                                                        (turkey, lean ham, roast beef, chicken) 3 ounces 100 21   Egg (boiled, poached, scrambled) 1 Egg 60 7   Egg Substitute 1/2 cup 70 10   Nuts (limit to 1 serving per day)  3 Tbsp. 150 7   Nut [place] (peanut, almond)  Limit to 1 serving or less daily 1 Tbsp. 90 4   Soy Burger (varies) 1  15   Garbanzo, Black, [place] Beans 1/2 cup 110 7   Refried Beans 1/2 cup 100 7   Kidney and Lima beans 1/2 cup 110 7   Tempeh 3 oz 175 18   Vegan crumbles 1/2 cup 100 14   Tofu 1/2 cup 110 14   Chili (beans and extra lean beef or turkey) 1 cup 200 23   Lentil Stew/Soup 1 cup 150 12   Black Bean Soup 1 cup 175 12       On-the-Go Breakfast Ideas  As of 2015, the latest research shows what a huge impact eating breakfast has on losing weight and feeling your best. People lose more weight when they make breakfast their biggest meal of the day compared to Dinner, but  even if you cannot go to that degree, getting a breakfast that has at least 20 grams of protein and even a moderate amount of fat is ideal for maintaining good energy through the day and limits overeating in the evening hours.  The following are some quick and easy suggestions for at least getting something of substance into your body in the morning.  Enjoy!    Eating breakfast within 90 minutes of waking up is an important part of taking care of your body on a restricted calorie diet plan.  After sleeping for hours, your body is in need of fuel.  An ideal breakfast is a combination of protein, whole-grain carbohydrates, or fruit.  Here s why:    -Protein digests very slowly in the body, helping you feel more satisfied.  -Whole grains provide dietary fiber, which also digests slowly and helps keep your gut clean.  -Fruit is a great source of vitamins, minerals, and fiber.     Each one of these breakfast combinations has between 200-300 calories and 15-20 grams of protein.  Feel free to mix and match!    Bone Broth (chicken bone broth or beef bone broth) is a great way to boost protein content. 8oz of bone broth will typically have 9-12grams of protein for 40kcal of energy.    Protein: Choose  -1/2 cup low-fat cottage cheese  -2 hard boiled eggs , or one cooked in olive oil (low/slow heat).  -1 low fat string cheese stick  -1 Tablespoon natural peanut butter  -Pathbrite vegetarian sausage cinda (found in freezer section)  -1 slice lowfat cheese  -6 oz 2% or lowfat Greek yogurt, such as Fage or Oikos.    PLUS    Whole Grains:  Choose   -1 whole wheat English muffin  -1 whole wheat missy, half  -1/2 Fiber One frozen muffin, thawed  -1/2 Fiber One toaster pastry  -1 whole wheat bagel thin  -1/2 cup Kashi cereal  -1 Kashi waffle (or other whole grain high-fiber waffle)  Aim for whole grain/sprouted breads with at least 3g of fiber/slice if having bread. [place] is one such brand.    OR    Fruit: Choose  -1/3  cup blueberries  -1/2 banana (or a plantain- similar to a banana, yet smaller)  -1/2 cup cantaloupe cubes  -1 small apple  -1 small orange  -1/2 cup strawberries  -handful raspberries/blackberries (each berry is about 1 calorie).    *Adapted from [employer], Fall 20    Ten Breakfasts Under 250 calories    Ideally, getting between 350-600 calories  (depending on starting height and weight)for breakfast is ideal for avoiding hunger later in the day, adjust/add to the following accordingly:    One- 250 calories, 8.5 g protein  1 slice whole-grain toast   1 Tbsp peanut butter    banana    Two- 250 calories, 8 g protein    cup nonfat/lowfat yogurt  1/3rd cup diced no-sugar peaches  1/3rd cup cereal (like Special K, Cheerios, or bran flakes)    Three- 250 calories, 25 g protein  1 egg scrambled with 1 oz skim milk    cup shredded cheddar    whole grain English muffin  1 oz [place] nelson  1 tsp margarine spread    Four- 225 calories, 25 g protein  1/2 cup Kashi Go-Lean cereal    cup skim milk mixed with 1 scoop Bariatric Advantage protein powder    cup no-sugar diced pears    Five- 250 calories, 20 g protein    cup oatmeal prepared with skim milk, 1 scoop protein powder, and sugar-free maple syrup    Six- 200 calories, 5 g protein  1 whole grain waffle, toasted  1 tablespoon creamy peanut or almond butter    Seven-  250 calories, 19 g protein  Breakfast sandwich: 1 slice whole grain toast, cut in half.  Add 1 scrambled egg and one slice cheddar  cheese.    Eight-  250 calories, 15 g protein  2 eggs scrambled with 1/3 cup frozen spinach (heat before adding to eggs) and 2 tablespoons low fat cream cheese.    Nine-  150 calories, 15 g protein  2/3rd cup cottage cheese    cup cantaloupe    Ten- 200 calories, 20 g protein  Fruit smoothie made with 4 oz. nonfat Greek yogurt,   cup berries, 1 scoop protein powder, and 4 oz skim milk.    Ten Lunches Under 250 Calories    Aim for lunch to be around 300-400 calories a day when  trying to lose weight and get that protein in!    One- 200 calories, 11 g protein  1/3 cup tuna salad made with light gonzales on 1 slice whole grain bread  1 small peeled apple    Two- 250 calories, 16 g protein  1/3 cup lowfat cottage cheese    cup cooked green beans    small fruit cocktail (in natural juice)    Three- 200 calories, 11 g protein    grilled cheese sandwich on whole grain bread with lowfat cheese  2/3rd cup of tomato soup    Four- 250 calories, 22 g protein  Deli wrap: 1 oz sliced turkey, 1 oz sliced ham, 1 oz sliced chicken rolled up with 1 slice low-fat cheese  1 small orange    Five- 250 calories, 28 g protein  2/3rd cup chili with 1 oz shredded cheese  4 saltine crackers    Six- 250 calories, 22 g protein  1 cup fresh spinach with 2 oz chicken, 1/3rd cup mandarin oranges, and 2 tablespoons sliced almonds with 1 tablespoon  vinaigrette dressing    Seven- 200 calories, 11 g protein  1 Tbsp sugar-free preserves and 1 Tbsp peanut butter on 1 slice whole grain toast    cup nonfat/lowfat Greek yogurt    Eight- 250 calories, 18 g protein  1 small soft-shell chicken taco with 1 oz shredded cheese, lettuce, tomato, salsa, and 1 Tbsp light sour cream    cup black beans    Nine- 225 calories, 13 g protein  2 ounces baked chicken  1/4 cup mashed potatoes    cup green beans    Ten- 200 calories, 21 g protein  Deli missy: 2 oz roast beef or other deli meat with 1 tsp Faisal mayonnaise and sliced tomato, onion, and lettuce  1/3rd cup cottage cheese      Ten Dinners Under 300 calories    If you're eating a large breakfast and medium lunch, keep dinner small.  300-400 calories is ideal for most people depending on their caloric needs.    One- 300 calories, 12 g protein  1-inch thick slice of turkey meatloaf    cup baked butternut squash    Two- 200 calories, 9 g protein  Bread-less BLT: 3 slices turkey nelson, sliced tomato, wrapped in a large lettuce leaf    cup peeled fruit    Three- 275 calories, 36 g protein  3 oz  roasted chicken    cup cooked broccoli    cup shredded cheddar cheese    cup unsweetened applesauce    Four- 200 calories, 25 g protein  3 oz baked tilapia  1/3rd cup cooked carrots    cup yogurt    Five- 250 calories, 20 g protein  Grilled ham  n  Swiss: spread 2 tsp ghee or butter on 1 slice of whole grain bread.  Cut bread in half, layer 2 oz deli ham with 1 piece of Swiss cheese and grill until cheese is melted.    cup cooked vegetables    Six- 250 calories, 18 g protein  Vegetarian cheeseburger: 1 [place] cheeseburger topped with lettuce, onion, tomato, and ketchup/mustard    cup sweet potato fries    Seven- 250 calories, 18 g protein  Pork pot roast: 2 oz roasted pork loin, 1/3rd cup roasted carrots,   medium potato, cooked with   cup gravy    Eight- 330 calories, 25 g protein  2 oz meatballs (about 2 small meatballs)    cup spaghetti sauce  1/2 piece toast topped with 1 tsp ghee or butterand topped with garlic powder, toasted in oven    Nine- 250 calories, 16 g protein  Mexican pizza: one 8  corn tortilla topped with 2 oz chicken,   cup salsa, 2 tablespoons black beans, 2 tablespoons shredded cheese.  Bake until cheese is melted.    Ten- 250 calories, 22 g protein  Shrimp stir-mckeon: 3 oz cooked shrimp, 1/6th onion,   pepper,   cup chopped carrots sautéed in 1 tablespoon olive oil, topped with 2 tablespoons stir mckeon sauce and a pinch of sesame seeds        150 Calories or Less Snack Ideas   1 hardboiled egg with   cup berries  1 small apple with 1 hardboiled egg  10 almonds with   cup berries  2 clementines with 1 light string cheese  1 light string cheese with   sliced apple  1 light string cheese wrapped in 2 slices of turkey  4 100% whole wheat crackers (e.g. Triscuit) with 1 light string cheese    c. cottage cheese with   cup fruit and 1 Tbsp sunflower seeds     cup cottage cheese with   of an avocado     can tuna fish with 1 cup sliced cucumbers     cup roasted garbanzo beans with paprika and cayenne  pepper    baked sweet potato with   cup chili beans or   cup cottage cheese  2 oz. nitrate free turkey slices with 1 cup carrots  1 container (6 oz) of low sugar (less than 10 grams of sugar) greek yogurt   3 Tablespoons of hummus with 1 cup sliced bell peppers   2 Tablespoons of hummus with 15 baby carrots  4 Tablespoons ranch dip made with plain Greek Yogurt and 3 mini cucumbers  1/4 cup nuts (any kind)  1 Tablespoon peanut butter with 1 stalk celery   1 dill pickle wrapped in 1-2 slices of deli ham with 1 tsp of mayonnaise/mustard.  Information about the Weight Loss Medication Phentermine    When combined with mindful eating and behavior changes, weight loss medications can be a nice additional tool to maximize your weight loss season.  There are no magic pills and without diet and behavior changes, weight loss will be minimal.  Think of this medication as a tool to make your diet and behavior changes easier and you'll enjoy a higher probability of success.  Remember not to skip meals, but use this medication to tolerate your reduced calories more easily.  If you are very hungry in the evenings, you are likely not eating enough in the first 10 hours of your day and need to focus on getting your protein requirements in at each of your 3 daily meals.      Phentermine is a stimulant medication related to the amphetamine class of medication but with a lower risk of dependence and addiction.  It is used for weight loss by suppressing the appetite region of the brain.  It also may speed up the metabolic rate and give a person more energy.  Like any medication there are potential side effects and the most common are:  Dry mouth occurs in almost everyone (hydrate well), fewer people experience Palpatations, fast heart rate, elevation of blood pressure, restlessness, insomnia, dizziness, change in mood, tremor, headache, changes in bowel movements,itchiness, changes in sex drive.  If you are or may have become pregnant,  "do not use phentermine as it increases the risk for birth defects/miscarriage.  Do not use if breastfeeding.    Some people can develop serious side effects which include:  Heart strain (\"ischemia\").  Tachycardia (fast heart rate or irregular heart rate).  Hypertension  Pulmonary Hypertension  Psychosis  Dependency and abuse has occurred in some.  If you've been on high dose (37.5mg) for long periods, phentermine should be tapered down over a few weeks before abruptly stopping as seizures have been reported rarely.    We do not recommend taking it in combination with the following medications due to potential drug interactions which can increase the risk of side effects and/or potential for seizures:    Absolutely contraindicated are:  Amphetamines or other stimulants like ADHD medication: (dextroamphetamine, amphetamine, diethylpropion, isocarboxazid, methamphetamine, lisdexamfetamine, benzphetamine,dexmethylphenidate, methylphenidate, selegiline patch, sibutramine, tranylcypromine.    Avoid use with:   Dopamine, dobutamine, ephedra, ephedrine, epinephrine, isoproterenol, linezolid, norepinephrine, phenylephrine injection, venlafaxine (Effexor).    Monitor or modify dose with:  Acebutolol, atenolol, betaxolol, bisoprolol, carvedilol, droxidopa, esmolol, labetalol, magnesium citrate, metoprolol, nadolol, nebivolol, penbutolol, pindolol, propranolol, sotalol, timolol.    Caution with: armodafinil,betaxolol eye drops, brexpiprazole, bupropion, busulfan, caffeine, carteolol drops, enzalutaminde, ginseng, green tea, guarana, levobunolol drops, lindane cream, modafinil, afrin nasal spray (oxymetazoline), pamabrom, phenylephrine oral and nasal spray, pseudoephedrine (sudafed), rasgiline, sleegiline, bowel prep, tiagabine, timolol drops,  TRAMADOL due to increased risk of seizures.    The current cheapest place to fill your prescription is at [facility] or [place] pharmacy,[facility] or [facility] " "and is around $22for 90 tablets.  Occasionally, [employer] and [employer] have price matched, so call around and get the best price for you.  Other pharmacies may charge closer to$70- $100 for the same prescription. You don't have to be a member to use the pharmacy at [facility] currently.  An alternative some patients have tried is using a voucher system through [employer].  $25 paid on their website gets you a  voucher that can allows you to pick your meds up at [facility] without paying anything more.  [employer] may also offer discounted coupons and give prices around you.    Dosing:  We start with half a tablet for the first 2-3 weeks and if tolerating it without problems, you can take up to one full tablet daily in the morning after breakfast.  For those with evening hunger problems, sometimes half a tablet in the morning and half a tablet around 1 pm can be effective, however, risks of nighttime insomnia/restless increase with afternoon dosing so call me at the clinic if considering this regimen or having any issues.  You only have to use the amount effective for you, not to exceed one full tablet.  It can also be used situationaly and does not have to be taken every day. For more sensitive individuals,: get a pill cutter and cut the half tab into quarters and use a quarter of a tablet, about 9mg, for a couple weeks before increasing to half a tablet (18.75mg) if needed.  As always, if any questions give us a call at the [facility] telephone:  [phone].     Don't use Phentermine if sick/ill or using other stimulants/cold medications and it's OK to skip days that you don't feel the need for appetite suppressant assistance.  This medication works the day you take it and doesn't require \"building up\" in the system.    "

## 2023-10-12 NOTE — PROGRESS NOTES
Nirali Hughes is 33 year old  female who presents for a billable video visit today.    How would you like to obtain your AVS? MyChart  If dropped from the video visit, the video invitation should be resent by: Text to cell phone: [phone]  Will anyone else be joining your video visit? No      Video Start Time: 3:03 PM    Are there any specific questions or needs that you would like addressed at your visit today? Did not start the naltrexone    Video-Visit Details    Type of service:  Video Visit    Platform used for Video Visit: Prepmatic    Video End Time (time video stopped): 3:38 PM    Originating Location (pt. Location): Home      Distant Location (provider location):  On-site    Distant Location (provider location):  [facility]      Bariatric Clinic Follow-Up Visit:    Nirali Hughes is a 33 year old  female with Body mass index is 39.91 kg/m .  presenting here today for follow-up on non-surgical efforts for weight loss. Original Intake visit occurred on 2/10/23 with some interest in our surgical program but ultimately decided to pursue non surgical weight loss this year due to recent divorce/stressors and limited self care time.  She started at that visit with a weight of 298 lbs and BMI of 46.7.  Along with diet and behavior changes, she has been using phentermine and metformin (her insurance this year doesn't affordably cover Wegovy) to assist her weight loss goals.  See her intake visit notes for details on identified contributors to weight gain in the past. Chart review shows she met finally with our dietician on 7/10/23 after previous cancellations and has a RMR of 1998 kcal/day and protein intake goal of 80-100g/day of lean protein.    2/10/23 labs showed low vitamin D at 18mcg/L, normal thyroid function, CBC, CMP and A1c of 5.4%. Weekly 1250mcg D2 boost was prescribed for 12 weeks and has been using 50mcg/day supplementation in the interim.      Weight:   Wt Readings from Last 5 Encounters:   10/12/23 115.6 kg (254 lb 12.8 oz)   06/28/23 124.3 kg (274 lb)   04/14/23 131.5 kg (289 lb 12.8 oz)   02/10/23 135.2 kg (298 lb)   10/17/19 122.3 kg (269 lb 9.6 oz)    pounds    Down 44 lbs, a 14.7% total body weight reduction thus far.  Comorbidities:  Patient Active Problem List   Diagnosis    Morbid obesity (H)    Normal delivery    Leiomyoma of uterus affecting pregnancy    Insulin controlled gestational diabetes mellitus (GDM) in third trimester     Lab Results   Component Value Date    A1C 5.4 02/10/2023       Current Outpatient Medications:     calcium carbonate (OS-FABIOLA) 500 MG tablet, Take 1 tablet by mouth 2 times daily, Disp: , Rfl:     magnesium 250 MG tablet, Take 1 tablet by mouth daily, Disp: , Rfl:     metFORMIN (GLUCOPHAGE) 500 MG tablet, Start half a tablet for 2 weeks, then increase to one full tablet with supper if tolerated., Disp: 90 tablet, Rfl: 0    Multiple Vitamin (MULTIVITAMIN ADULT PO), , Disp: , Rfl:       Interim: Since our last visit, she has continued to lose weight. Phentermine's discontinuation has not slowed her weight loss thus far and she finds her sleep is is improved.  Has met with dietician and enjoying her protein rich foods more commonly through the day. Getting more protein from food sources again and hydrating better now.   Exercise plan is now increased to 5 days weekly for 8 week program and will start up some strength training based 8 week program.   She never started naltrexone after our last visit so we'll discontinue that option for her and discussed how her life is much less stressful and breakfasts are now happening earlier in her day where she feels phentermine may be better tolerated. We'll restart the half tablet after breakfast and see how sleep is affected.  Reviewed her RMR data after weight loss and given high exercise regimen/strength training 5 days weekly we'll look to get her 1800-1900kcal/day at her  current weight/activity level to continue good progress/satiety and muscle support with 90-110g/day of lean proteins, good hydration. .    Plan:   1.  Diet: aiming for 1800-1900kcal/day with 90-110grams of lean protein daily to support 5 day weekly strength training program, 3-4 hours a week. Consider recovery snack of 100-150kcal and 8-12g of protein if hard workouts lasting over 45 minutes.   2. Exercise: 5 day weekly video strength training. Great job! It would be good to measure body fat to see how body composition changes over your program. If desired, call the clinic to come in for weight check on our Tanita scale which provides good estimate of body fat.   3. Medication: restart phentermine, half tablet after breakfast if tolerated. If sleep disruption, consider a pill cutter and using quarter tablet to start for a couple weeks before increasing to half tablet. Don't use on sick days or if under too much stress if finding it to be too stimulating. Don't mix with cold medications or other stimulants. Stop if chest pain/headaches/racing heart beats or mood swings. Avoid pregnancy while using.    Continue metformin.  4. Down over 14% total body weight thus far. We'll continue to focus on non surgical support the next couple years given your great results thus far.  5. Goals: BMI is now under 40, dropping out of the highest risk category of excess weight. A good medium to long term goal is to stabilize BMI under 35, 220 lbs or less.      We discussed [facility] Bariatric Basics including:  -eating 3 meals daily  -reviewed metabolic needs for weight loss based on Resting Metabolic Rate  -protein goals supportive of healthy weight loss  -avoiding/limiting calorie containing beverages  -We discussed the importance of restorative sleep and stress management in maintaining a healthy weight.  -We discussed the National Weight Control Registry healthy weight maintenance strategies and ways to optimize metabolism.  -We  "discussed the importance of physical activity including cardiovascular and strength training in maintaining a healthier weight and explored viable options.      Most recent labs:  Lab Results   Component Value Date    WBC 7.7 02/10/2023    HGB 14.7 02/10/2023    HCT 43.1 02/10/2023    MCV 86 02/10/2023     02/10/2023     No results found for: \"CHOL\"  No results found for: \"HDL\"  No components found for: \"LDLCALC\"  No results found for: \"TRIG\"  No results found for: \"CHOLHDL\"  Lab Results   Component Value Date    ALT 22 02/10/2023    AST 27 02/10/2023    ALKPHOS 71 02/10/2023     No results found for: \"HGBA1C\"  Lab Results   Component Value Date    B12 1,071 02/10/2023     No components found for: \"VITDT1\"  No results found for: \"ANTOLIN\"  No results found for: \"PTHI\"  No results found for: \"ZN\"  No results found for: \"VIB1WB\"  Lab Results   Component Value Date    TSH 1.03 02/10/2023     No results found for: \"TEST\"    DIETARY HISTORY  Tracking technique: yes, fitness/weight  Positive Changes Since Last Visit: workouts are outstanding and developing stamina/strength and seeing changes  Struggling With: some increased snacking urge    Getting Adequate Protein: reviewed targets  Sleep schedule: better. Feels she could retry phentermine now that less stressful and would dose earlier in theday as was skipping breakfast earlier at start of program and now eating around 9am..      PHYSICAL ACTIVITY PATTERNS:  Cardiovascular: fitness videos 5x weekly  Strength Training: strength training 5x weekly w/ videos/weights.    REVIEW OF SYSTEMS  Feels great. Happy with progress, .  PHYSICAL EXAM:  Vitals: Ht 1.702 m (5' 7\")   Wt 115.6 kg (254 lb 12.8 oz)   BMI 39.91 kg/m    Weight:   Wt Readings from Last 3 Encounters:   10/12/23 115.6 kg (254 lb 12.8 oz)   06/28/23 124.3 kg (274 lb)   04/14/23 131.5 kg (289 lb 12.8 oz)         GEN: Pleasant, well groomed, in no acute distress  HEENT:  normal facies .  NECK: No " swelling.  HEART: .  LUNGS: No respiratory difficulty noted. No cough. .  ABDOMEN: .  EXTREMITIES: No tremor. Ambulation is indpendent..  NEURO: Alert and Oriented X3, fluent speech. .  SKIN: No visible rashes. .    Interim study results: no.      38 minutes spent by me on the date of the encounter doing chart review, history and exam, documentation and further activities per the note   Rajeev Figueroa MD  [facility]  9:44 AM  10/12/2023

## 2023-10-12 NOTE — TELEPHONE ENCOUNTER
Attempted to reach pt after visit per request to make 3 month follow up. No answer/Straight to voicemail.     LM for her to call us to get this scheduled, provided our call back number for this.

## 2023-10-12 NOTE — LETTER
10/12/2023         RE: Nirali Hughes  [address] [place] [address]        Dear Colleague,    Thank you for referring your patient, Nirali Hughes, to the [facility]ATR[facility]. Please see a copy of my visit note below.    Nirali Hughes is 33 year old  female who presents for a billable video visit today.    How would you like to obtain your AVS? MyChart  If dropped from the video visit, the video invitation should be resent by: Text to cell phone: [phone]  Will anyone else be joining your video visit? No      Video Start Time: 3:03 PM    Are there any specific questions or needs that you would like addressed at your visit today? Did not start the naltrexone    Video-Visit Details    Type of service:  Video Visit    Platform used for Video Visit: Pinguo    Video End Time (time video stopped): 3:38 PM    Originating Location (pt. Location): Home      Distant Location (provider location):  On-site    Distant Location (provider location):  [facility]      Bariatric Clinic Follow-Up Visit:    Nirali Hughes is a 33 year old  female with Body mass index is 39.91 kg/m .  presenting here today for follow-up on non-surgical efforts for weight loss. Original Intake visit occurred on 2/10/23 with some interest in our surgical program but ultimately decided to pursue non surgical weight loss this year due to recent divorce/stressors and limited self care time.  She started at that visit with a weight of 298 lbs and BMI of 46.7.  Along with diet and behavior changes, she has been using phentermine and metformin (her insurance this year doesn't affordably cover Wegovy) to assist her weight loss goals.  See her intake visit notes for details on identified contributors to weight gain in the past. Chart review shows she met finally with our dietician on 7/10/23 after previous cancellations and  has a RMR of 1998 kcal/day and protein intake goal of 80-100g/day of lean protein.    2/10/23 labs showed low vitamin D at 18mcg/L, normal thyroid function, CBC, CMP and A1c of 5.4%. Weekly 1250mcg D2 boost was prescribed for 12 weeks and has been using 50mcg/day supplementation in the interim.     Weight:   Wt Readings from Last 5 Encounters:   10/12/23 115.6 kg (254 lb 12.8 oz)   06/28/23 124.3 kg (274 lb)   04/14/23 131.5 kg (289 lb 12.8 oz)   02/10/23 135.2 kg (298 lb)   10/17/19 122.3 kg (269 lb 9.6 oz)    pounds    Down 44 lbs, a 14.7% total body weight reduction thus far.  Comorbidities:  Patient Active Problem List   Diagnosis     Morbid obesity (H)     Normal delivery     Leiomyoma of uterus affecting pregnancy     Insulin controlled gestational diabetes mellitus (GDM) in third trimester     Lab Results   Component Value Date    A1C 5.4 02/10/2023       Current Outpatient Medications:      calcium carbonate (OS-FABIOLA) 500 MG tablet, Take 1 tablet by mouth 2 times daily, Disp: , Rfl:      magnesium 250 MG tablet, Take 1 tablet by mouth daily, Disp: , Rfl:      metFORMIN (GLUCOPHAGE) 500 MG tablet, Start half a tablet for 2 weeks, then increase to one full tablet with supper if tolerated., Disp: 90 tablet, Rfl: 0     Multiple Vitamin (MULTIVITAMIN ADULT PO), , Disp: , Rfl:       Interim: Since our last visit, she has continued to lose weight. Phentermine's discontinuation has not slowed her weight loss thus far and she finds her sleep is is improved.  Has met with dietician and enjoying her protein rich foods more commonly through the day. Getting more protein from food sources again and hydrating better now.   Exercise plan is now increased to 5 days weekly for 8 week program and will start up some strength training based 8 week program.   She never started naltrexone after our last visit so we'll discontinue that option for her and discussed how her life is much less stressful and breakfasts are now happening  earlier in her day where she feels phentermine may be better tolerated. We'll restart the half tablet after breakfast and see how sleep is affected.  Reviewed her RMR data after weight loss and given high exercise regimen/strength training 5 days weekly we'll look to get her 1800-1900kcal/day at her current weight/activity level to continue good progress/satiety and muscle support with 90-110g/day of lean proteins, good hydration. .    Plan:   1.  Diet: aiming for 1800-1900kcal/day with 90-110grams of lean protein daily to support 5 day weekly strength training program, 3-4 hours a week. Consider recovery snack of 100-150kcal and 8-12g of protein if hard workouts lasting over 45 minutes.   2. Exercise: 5 day weekly video strength training. Great job! It would be good to measure body fat to see how body composition changes over your program. If desired, call the clinic to come in for weight check on our Tanita scale which provides good estimate of body fat.   3. Medication: restart phentermine, half tablet after breakfast if tolerated. If sleep disruption, consider a pill cutter and using quarter tablet to start for a couple weeks before increasing to half tablet. Don't use on sick days or if under too much stress if finding it to be too stimulating. Don't mix with cold medications or other stimulants. Stop if chest pain/headaches/racing heart beats or mood swings. Avoid pregnancy while using.    Continue metformin.  4. Down over 14% total body weight thus far. We'll continue to focus on non surgical support the next couple years given your great results thus far.  5. Goals: BMI is now under 40, dropping out of the highest risk category of excess weight. A good medium to long term goal is to stabilize BMI under 35, 220 lbs or less.      We discussed [facility] Bariatric Basics including:  -eating 3 meals daily  -reviewed metabolic needs for weight loss based on Resting Metabolic Rate  -protein goals supportive of  "healthy weight loss  -avoiding/limiting calorie containing beverages  -We discussed the importance of restorative sleep and stress management in maintaining a healthy weight.  -We discussed the National Weight Control Registry healthy weight maintenance strategies and ways to optimize metabolism.  -We discussed the importance of physical activity including cardiovascular and strength training in maintaining a healthier weight and explored viable options.      Most recent labs:  Lab Results   Component Value Date    WBC 7.7 02/10/2023    HGB 14.7 02/10/2023    HCT 43.1 02/10/2023    MCV 86 02/10/2023     02/10/2023     No results found for: \"CHOL\"  No results found for: \"HDL\"  No components found for: \"LDLCALC\"  No results found for: \"TRIG\"  No results found for: \"CHOLHDL\"  Lab Results   Component Value Date    ALT 22 02/10/2023    AST 27 02/10/2023    ALKPHOS 71 02/10/2023     No results found for: \"HGBA1C\"  Lab Results   Component Value Date    B12 1,071 02/10/2023     No components found for: \"VITDT1\"  No results found for: \"ANTOLIN\"  No results found for: \"PTHI\"  No results found for: \"ZN\"  No results found for: \"VIB1WB\"  Lab Results   Component Value Date    TSH 1.03 02/10/2023     No results found for: \"TEST\"    DIETARY HISTORY  Tracking technique: yes, fitness/weight  Positive Changes Since Last Visit: workouts are outstanding and developing stamina/strength and seeing changes  Struggling With: some increased snacking urge    Getting Adequate Protein: reviewed targets  Sleep schedule: better. Feels she could retry phentermine now that less stressful and would dose earlier in theday as was skipping breakfast earlier at start of program and now eating around 9am..      PHYSICAL ACTIVITY PATTERNS:  Cardiovascular: fitness videos 5x weekly  Strength Training: strength training 5x weekly w/ videos/weights.    REVIEW OF SYSTEMS  Feels great. Happy with progress, .  PHYSICAL EXAM:  Vitals: Ht 1.702 m (5' 7\")   Wt " 115.6 kg (254 lb 12.8 oz)   BMI 39.91 kg/m    Weight:   Wt Readings from Last 3 Encounters:   10/12/23 115.6 kg (254 lb 12.8 oz)   06/28/23 124.3 kg (274 lb)   04/14/23 131.5 kg (289 lb 12.8 oz)         GEN: Pleasant, well groomed, in no acute distress  HEENT:  normal facies .  NECK: No swelling.  HEART: .  LUNGS: No respiratory difficulty noted. No cough. .  ABDOMEN: .  EXTREMITIES: No tremor. Ambulation is indpendent..  NEURO: Alert and Oriented X3, fluent speech. .  SKIN: No visible rashes. .    Interim study results: no.      38 minutes spent by me on the date of the encounter doing chart review, history and exam, documentation and further activities per the note   Rajeev Figueroa MD  [facility]ariatric [facility]  9:44 AM  10/12/2023      Again, thank you for allowing me to participate in the care of your patient.        Sincerely,        Rajeev Figueroa MD

## 2024-01-16 ENCOUNTER — MYC REFILL (OUTPATIENT)
Dept: SURGERY | Facility: CLINIC | Age: 34
End: 2024-01-16
Payer: COMMERCIAL

## 2024-01-16 DIAGNOSIS — E66.812 CLASS 2 OBESITY DUE TO EXCESS CALORIES WITHOUT SERIOUS COMORBIDITY IN ADULT, UNSPECIFIED BMI: ICD-10-CM

## 2024-01-16 DIAGNOSIS — E66.09 CLASS 2 OBESITY DUE TO EXCESS CALORIES WITHOUT SERIOUS COMORBIDITY IN ADULT, UNSPECIFIED BMI: ICD-10-CM

## 2024-01-18 RX ORDER — PHENTERMINE HYDROCHLORIDE 37.5 MG/1
TABLET ORAL
Qty: 45 TABLET | Refills: 0 | Status: SHIPPED | OUTPATIENT
Start: 2024-01-18 | End: 2024-05-13

## 2024-05-13 ENCOUNTER — MYC REFILL (OUTPATIENT)
Dept: SURGERY | Facility: CLINIC | Age: 34
End: 2024-05-13
Payer: COMMERCIAL

## 2024-05-13 DIAGNOSIS — E66.09 CLASS 2 OBESITY DUE TO EXCESS CALORIES WITHOUT SERIOUS COMORBIDITY IN ADULT, UNSPECIFIED BMI: ICD-10-CM

## 2024-05-13 DIAGNOSIS — E66.812 CLASS 2 OBESITY DUE TO EXCESS CALORIES WITHOUT SERIOUS COMORBIDITY IN ADULT, UNSPECIFIED BMI: ICD-10-CM

## 2024-05-13 DIAGNOSIS — E66.813 CLASS 3 SEVERE OBESITY DUE TO EXCESS CALORIES WITHOUT SERIOUS COMORBIDITY WITH BODY MASS INDEX (BMI) OF 45.0 TO 49.9 IN ADULT (H): ICD-10-CM

## 2024-05-13 DIAGNOSIS — E66.01 CLASS 3 SEVERE OBESITY DUE TO EXCESS CALORIES WITHOUT SERIOUS COMORBIDITY WITH BODY MASS INDEX (BMI) OF 45.0 TO 49.9 IN ADULT (H): ICD-10-CM

## 2024-05-14 RX ORDER — PHENTERMINE HYDROCHLORIDE 37.5 MG/1
TABLET ORAL
Qty: 45 TABLET | Refills: 0 | Status: SHIPPED | OUTPATIENT
Start: 2024-05-14

## 2024-07-14 ENCOUNTER — HEALTH MAINTENANCE LETTER (OUTPATIENT)
Age: 34
End: 2024-07-14

## 2024-08-09 ENCOUNTER — TELEPHONE (OUTPATIENT)
Dept: SURGERY | Facility: CLINIC | Age: 34
End: 2024-08-09
Payer: COMMERCIAL

## 2024-08-09 NOTE — TELEPHONE ENCOUNTER
Sent my msg 8/9    Please add this patient to wait list as I cannot refill her phentermine without a visit within 6 months and it's been about 10 months since our last visit. Thank you,

## 2025-07-19 ENCOUNTER — HEALTH MAINTENANCE LETTER (OUTPATIENT)
Age: 35
End: 2025-07-19